# Patient Record
Sex: FEMALE | Race: WHITE | NOT HISPANIC OR LATINO | Employment: OTHER | URBAN - METROPOLITAN AREA
[De-identification: names, ages, dates, MRNs, and addresses within clinical notes are randomized per-mention and may not be internally consistent; named-entity substitution may affect disease eponyms.]

---

## 2017-01-14 RX ORDER — ESCITALOPRAM OXALATE 10 MG/1
TABLET ORAL
Qty: 30 TABLET | Refills: 0 | Status: SHIPPED | OUTPATIENT
Start: 2017-01-14 | End: 2017-02-11 | Stop reason: SDUPTHER

## 2017-02-11 RX ORDER — ESCITALOPRAM OXALATE 10 MG/1
TABLET ORAL
Qty: 30 TABLET | Refills: 0 | Status: SHIPPED | OUTPATIENT
Start: 2017-02-11 | End: 2017-03-17 | Stop reason: SDUPTHER

## 2017-03-17 RX ORDER — ESCITALOPRAM OXALATE 10 MG/1
TABLET ORAL
Qty: 30 TABLET | Refills: 0 | Status: SHIPPED | OUTPATIENT
Start: 2017-03-17 | End: 2017-07-14 | Stop reason: SDUPTHER

## 2017-04-27 RX ORDER — ESCITALOPRAM OXALATE 10 MG/1
10 TABLET ORAL DAILY
Qty: 30 TABLET | Refills: 5 | Status: SHIPPED | OUTPATIENT
Start: 2017-04-27 | End: 2017-07-14 | Stop reason: SDUPTHER

## 2017-07-14 ENCOUNTER — OFFICE VISIT (OUTPATIENT)
Dept: FAMILY MEDICINE CLINIC | Facility: CLINIC | Age: 62
End: 2017-07-14

## 2017-07-14 VITALS
HEART RATE: 69 BPM | HEIGHT: 67 IN | TEMPERATURE: 98.2 F | SYSTOLIC BLOOD PRESSURE: 112 MMHG | WEIGHT: 166.4 LBS | BODY MASS INDEX: 26.12 KG/M2 | DIASTOLIC BLOOD PRESSURE: 80 MMHG | OXYGEN SATURATION: 96 %

## 2017-07-14 DIAGNOSIS — M79.605 PAIN OF LEFT LOWER EXTREMITY: ICD-10-CM

## 2017-07-14 DIAGNOSIS — Z09 FRACTURE FOLLOW-UP: ICD-10-CM

## 2017-07-14 DIAGNOSIS — D64.9 ANEMIA, UNSPECIFIED TYPE: Primary | ICD-10-CM

## 2017-07-14 DIAGNOSIS — E03.8 OTHER SPECIFIED HYPOTHYROIDISM: ICD-10-CM

## 2017-07-14 DIAGNOSIS — R53.83 OTHER FATIGUE: ICD-10-CM

## 2017-07-14 DIAGNOSIS — M89.8X5 PAIN IN FEMUR: ICD-10-CM

## 2017-07-14 LAB
25(OH)D3 SERPL-MCNC: 31.8 NG/ML (ref 30–100)
ALBUMIN SERPL-MCNC: 4.3 G/DL (ref 3.5–5.2)
ALBUMIN/GLOB SERPL: 1.4 G/DL
ALP SERPL-CCNC: 107 U/L (ref 39–117)
ALT SERPL W P-5'-P-CCNC: 11 U/L (ref 1–33)
ANION GAP SERPL CALCULATED.3IONS-SCNC: 13.1 MMOL/L
AST SERPL-CCNC: 21 U/L (ref 1–32)
BILIRUB SERPL-MCNC: 0.3 MG/DL (ref 0.1–1.2)
BUN BLD-MCNC: 9 MG/DL (ref 8–23)
BUN/CREAT SERPL: 13.2 (ref 7–25)
CALCIUM SPEC-SCNC: 9.9 MG/DL (ref 8.6–10.5)
CHLORIDE SERPL-SCNC: 97 MMOL/L (ref 98–107)
CO2 SERPL-SCNC: 27.9 MMOL/L (ref 22–29)
CREAT BLD-MCNC: 0.68 MG/DL (ref 0.57–1)
ERYTHROCYTE [DISTWIDTH] IN BLOOD BY AUTOMATED COUNT: 15.6 % (ref 4.5–15)
GFR SERPL CREATININE-BSD FRML MDRD: 88 ML/MIN/1.73
GLOBULIN UR ELPH-MCNC: 3 GM/DL
GLUCOSE BLD-MCNC: 87 MG/DL (ref 65–99)
HCT VFR BLD AUTO: 36 % (ref 31–42)
HGB BLD-MCNC: 11.5 G/DL (ref 12–18)
LYMPHOCYTES # BLD AUTO: 2.7 10*3/MM3 (ref 1.2–3.4)
LYMPHOCYTES NFR BLD AUTO: 44.8 % (ref 21–51)
MCH RBC QN AUTO: 29.7 PG (ref 26.1–33.1)
MCHC RBC AUTO-ENTMCNC: 31.8 G/DL (ref 33–37)
MCV RBC AUTO: 93.2 FL (ref 80–99)
MONOCYTES # BLD AUTO: 0.2 10*3/MM3 (ref 0.1–0.6)
MONOCYTES NFR BLD AUTO: 3 % (ref 2–9)
NEUTROPHILS # BLD AUTO: 3.1 10*3/MM3 (ref 1.4–6.5)
NEUTROPHILS NFR BLD AUTO: 52.2 % (ref 42–75)
PLATELET # BLD AUTO: 347 10*3/MM3 (ref 150–450)
PMV BLD AUTO: 6.9 FL (ref 7.1–10.5)
POTASSIUM BLD-SCNC: 4.4 MMOL/L (ref 3.5–5.2)
PROT SERPL-MCNC: 7.3 G/DL (ref 6–8.5)
RBC # BLD AUTO: 3.87 10*6/MM3 (ref 4–6)
SODIUM BLD-SCNC: 138 MMOL/L (ref 136–145)
TSH SERPL DL<=0.05 MIU/L-ACNC: 0.78 MIU/ML (ref 0.27–4.2)
WBC NRBC COR # BLD: 6 10*3/MM3 (ref 4.5–10)

## 2017-07-14 PROCEDURE — 99214 OFFICE O/P EST MOD 30 MIN: CPT | Performed by: INTERNAL MEDICINE

## 2017-07-14 PROCEDURE — 80053 COMPREHEN METABOLIC PANEL: CPT | Performed by: INTERNAL MEDICINE

## 2017-07-14 PROCEDURE — 82306 VITAMIN D 25 HYDROXY: CPT | Performed by: INTERNAL MEDICINE

## 2017-07-14 PROCEDURE — 85025 COMPLETE CBC W/AUTO DIFF WBC: CPT | Performed by: INTERNAL MEDICINE

## 2017-07-14 PROCEDURE — 84443 ASSAY THYROID STIM HORMONE: CPT | Performed by: INTERNAL MEDICINE

## 2017-07-14 RX ORDER — ESCITALOPRAM OXALATE 10 MG/1
10 TABLET ORAL DAILY
Qty: 30 TABLET | Refills: 5 | Status: SHIPPED | OUTPATIENT
Start: 2017-07-14 | End: 2017-08-11 | Stop reason: SDUPTHER

## 2017-07-14 RX ORDER — HYDROCODONE BITARTRATE AND ACETAMINOPHEN 10; 325 MG/1; MG/1
1-2 TABLET ORAL
COMMUNITY
Start: 2017-07-11 | End: 2017-10-13

## 2017-07-14 NOTE — PROGRESS NOTES
Subjective   Megan Ratliff is a 61 y.o. female.   Follow-up from hospitalization for fractured femur this occurred in the setting of a fall down steps facilitated by her pet.  History of Present Illness   As above regarding 2 different surgeries for her fractured femur is walking with cane now doing outpatient physical therapy and improved.  She is worried about osteoporosis has several risk factors including family history and her mother surgical menopause followed by chemotherapy for breast cancer.  Does not recall receiving tamoxifen or other estrogen blockers.  Still having some fever pain that improved also been having some mild fatigue lately is not sure with a recent hospitalization versus other does have a history of hypothyroidism the past has not required treatment in recent years so we definitely need recheck that.  Is doing well with her second surgery did require transfusion ×2 during hospital stay she is not aware of getting a repeat CBC so we will do that.  There is no report sleep apnea.  Surprisingly been relative stable with this    Review of Systems   Constitutional: Positive for fatigue.   All other systems reviewed and are negative.      Objective   Vitals:    07/14/17 1249   BP: 112/80   Pulse: 69   Temp: 98.2 °F (36.8 °C)   SpO2: 96%   Weight: 166 lb 6.4 oz (75.5 kg)     Physical Exam   Constitutional: She appears well-developed and well-nourished.   HENT:   Head: Normocephalic and atraumatic.   Eyes: Conjunctivae are normal. Pupils are equal, round, and reactive to light.   Neck: No tracheal deviation present. No thyromegaly present.   Cardiovascular: Normal rate, regular rhythm and normal heart sounds.    Pulmonary/Chest: Effort normal and breath sounds normal.   Abdominal: Soft. Bowel sounds are normal.   Neurological: She is alert.   Ambulates with assistance of a cane in right hand, healing surgical scar present in the left knee and femur area.  Ambulates clearly favoring the right leg  but overall ambulation satisfactory.  The patient physical therapy is happy with her improvement at this point also.   Skin: Skin is warm and dry.       No results found for: INR    Procedures    Assessment/Plan   1.  Fracture follow-up status post hospitalization for medical perspective doing fairly well and vitals signs are stable    2.  Anemia by history we'll get updated CBC patient did require transfusion ×2 in the hospital    3.  Fatigue unspecified plan await lab    4.  Hypothyroidism apparently off medicine for several years time we'll definitely recheck TSH    5.  Left femur pain although improving    6.  Pain left lower extremity apparently some tib-fib injury also healing by history.    Family history for ostial process and the mother as well as surgical menopause due to breast cancer treatment with chemotherapy we'll definitely get a DEXA scan as well as vitamin D looking for osteoporosis    Much of this encounter note is an electronic transcription/translation of spoken language to printed text.  The electronic translation of spoken language may permit erroneous, or at times, nonsensical words or phrases to be inadvertently transcribed.  Although I have reviewed the note for such errors, some may still exist.

## 2017-07-15 DIAGNOSIS — E55.9 VITAMIN D DEFICIENCY: ICD-10-CM

## 2017-07-15 DIAGNOSIS — D64.9 ANEMIA, UNSPECIFIED TYPE: Primary | ICD-10-CM

## 2017-07-15 RX ORDER — ERGOCALCIFEROL 1.25 MG/1
50000 CAPSULE ORAL WEEKLY
Qty: 12 CAPSULE | Refills: 0 | Status: SHIPPED | OUTPATIENT
Start: 2017-07-15 | End: 2017-09-27 | Stop reason: SDUPTHER

## 2017-07-31 ENCOUNTER — TELEPHONE (OUTPATIENT)
Dept: FAMILY MEDICINE CLINIC | Facility: CLINIC | Age: 62
End: 2017-07-31

## 2017-07-31 NOTE — TELEPHONE ENCOUNTER
Perhaps the more information this knees only other spine issues more specifics on what bothers her with her joints.

## 2017-08-08 ENCOUNTER — OFFICE VISIT (OUTPATIENT)
Dept: FAMILY MEDICINE CLINIC | Facility: CLINIC | Age: 62
End: 2017-08-08

## 2017-08-08 VITALS
DIASTOLIC BLOOD PRESSURE: 84 MMHG | BODY MASS INDEX: 25.9 KG/M2 | WEIGHT: 165 LBS | TEMPERATURE: 98.8 F | RESPIRATION RATE: 18 BRPM | HEIGHT: 67 IN | OXYGEN SATURATION: 98 % | HEART RATE: 74 BPM | SYSTOLIC BLOOD PRESSURE: 136 MMHG

## 2017-08-08 DIAGNOSIS — M05.79 RHEUMATOID ARTHRITIS INVOLVING MULTIPLE SITES WITH POSITIVE RHEUMATOID FACTOR (HCC): ICD-10-CM

## 2017-08-08 DIAGNOSIS — G89.29 CHRONIC LOW BACK PAIN WITHOUT SCIATICA, UNSPECIFIED BACK PAIN LATERALITY: Primary | ICD-10-CM

## 2017-08-08 DIAGNOSIS — M54.50 CHRONIC LOW BACK PAIN WITHOUT SCIATICA, UNSPECIFIED BACK PAIN LATERALITY: Primary | ICD-10-CM

## 2017-08-08 DIAGNOSIS — H61.23 BILATERAL IMPACTED CERUMEN: ICD-10-CM

## 2017-08-08 DIAGNOSIS — R22.0 SCALP MASS: ICD-10-CM

## 2017-08-08 PROCEDURE — 69209 REMOVE IMPACTED EAR WAX UNI: CPT | Performed by: INTERNAL MEDICINE

## 2017-08-08 PROCEDURE — 70260 X-RAY EXAM OF SKULL: CPT | Performed by: INTERNAL MEDICINE

## 2017-08-08 PROCEDURE — 72100 X-RAY EXAM L-S SPINE 2/3 VWS: CPT | Performed by: INTERNAL MEDICINE

## 2017-08-08 PROCEDURE — 99214 OFFICE O/P EST MOD 30 MIN: CPT | Performed by: INTERNAL MEDICINE

## 2017-08-08 RX ORDER — FLUCONAZOLE 150 MG/1
150 TABLET ORAL ONCE
Qty: 2 TABLET | Refills: 0 | Status: SHIPPED | OUTPATIENT
Start: 2017-08-08 | End: 2017-08-08

## 2017-08-08 RX ORDER — CEPHALEXIN 500 MG/1
500 CAPSULE ORAL 2 TIMES DAILY
Qty: 20 CAPSULE | Refills: 0 | Status: SHIPPED | OUTPATIENT
Start: 2017-08-08 | End: 2017-10-13

## 2017-08-08 NOTE — PROGRESS NOTES
Subjective   Megan Ratliff is a 61 y.o. female.   Complain of chronic back pain, multiple joint pains including hips shoulders  History of Present Illness   Patient has multiple painful joints as well as spine.  This is in pain management referral.  Has rheumatoid arthritis was hesitant to try any DMARD such as personal history of breast cancer would rather just try to deal with this posteriorly with cancer again.  She is requesting referral to Dr. Jordan or who a family member issues for further pain management help.  Since lower back is monitoring her problems do not seem recent imaging of the lower back which will proceed with.  See if epidurals might benefit her and that area.  Patient's also has a lump in the posterior scalp.  To the left of midline been somewhat recent.  No associated temperature but has had recent dental or oral infection problems and didn't know sometime after that.  Also ears checked that she has recurrent cerumen impaction.  Line pressure in ears wants be checked for cerumen impaction    Review of Systems   HENT: Positive for ear pain.    Musculoskeletal: Positive for arthralgias and back pain.   All other systems reviewed and are negative.      Objective   Vitals:    08/08/17 1414   BP: 136/84   Pulse: 74   Resp: 18   Temp: 98.8 °F (37.1 °C)   SpO2: 98%   Weight: 165 lb (74.8 kg)     Physical Exam   Constitutional: She appears well-developed and well-nourished.   HENT:   Head: Normocephalic and atraumatic.   Mouth/Throat: Oropharynx is clear and moist.   Both ears partially blocked by wax visible TMs appear okay both ears rate irrigated with H2O now resolution of cerumen impaction both TMs appear intact without any irritation ear canal noted.   Eyes: Conjunctivae are normal. Pupils are equal, round, and reactive to light.   Neck: No thyromegaly present.   There is a fullness posterior occiput to the left of midline near the occiput do not find any other soft tissue areas within the  occipital node chain.  No fluctuance is noted measures approximately 1-1.5 cm.  Suspect with recent oral infection is might be reactive supple note.  But is not sure.  Patient is nontoxic, no overt source of infection noted during ENT evaluation.   Cardiovascular: Normal rate, regular rhythm and normal heart sounds.    Pulmonary/Chest: Effort normal.   Abdominal: Soft. Bowel sounds are normal.   Musculoskeletal:   Patient gets 70° forward, 20° backwards, 30° left and right lateral flexion.  Of L-spine minimal discomfort with right lateral flexion   Neurological: She is alert.   Negative straight leg raise on the right, negative on the left.  Right knee jerk is trace unable illicit left knee jerk the patient's had a left knee replacement.   Skin: Skin is warm and dry.       No results found for: INR    Procedures  LS-spine shows some degree of scoliosis to the right.  Narrowing at L4-L5.  No comparison is available.  PA and lateral views obtained.  No other bony or soft tissue abnormalities are noted.  Assessment/Plan   1.  Chronic lumbar pain plan MRI of L-spine, refer to Dr. Blood    2.  Bilateral cerumen impaction ears irrigated with H2O with     resolution of cerumen impaction    3.  Left posterior scalp mass plan Keflex 500 4 times a day ×10 days observe for 2 more weeks to see if area resolves if not we'll get ENT referral.  4.  Rheumatoid arthritis by history declines rheumatology treatment for same.  We'll see if pain management has something offer.    Much of this encounter note is an electronic transcription/translation of spoken language to printed text.  The electronic translation of spoken language may permit erroneous, or at times, nonsensical words or phrases to be inadvertently transcribed.  Although I have reviewed the note for such errors, some may still exist.

## 2017-08-11 DIAGNOSIS — S72.90XK CLOSED FRACTURE OF FEMUR WITH NONUNION, UNSPECIFIED FRACTURE MORPHOLOGY, UNSPECIFIED LATERALITY, UNSPECIFIED PORTION OF FEMUR, SUBSEQUENT ENCOUNTER: Primary | ICD-10-CM

## 2017-08-11 DIAGNOSIS — R93.6 ABNORMAL FINDINGS ON DIAGNOSTIC IMAGING OF LIMBS: ICD-10-CM

## 2017-08-11 RX ORDER — ESCITALOPRAM OXALATE 10 MG/1
10 TABLET ORAL DAILY
Qty: 90 TABLET | Refills: 3 | Status: SHIPPED | OUTPATIENT
Start: 2017-08-11 | End: 2018-05-18 | Stop reason: SDUPTHER

## 2017-08-18 RX ORDER — CALCITONIN SALMON 200 [IU]/.09ML
1 SPRAY, METERED NASAL DAILY
Qty: 1 EACH | Refills: 11 | Status: SHIPPED | OUTPATIENT
Start: 2017-08-18 | End: 2017-08-29

## 2017-08-21 DIAGNOSIS — M54.5 LOW BACK PAIN, UNSPECIFIED BACK PAIN LATERALITY, UNSPECIFIED CHRONICITY, WITH SCIATICA PRESENCE UNSPECIFIED: Primary | ICD-10-CM

## 2017-08-23 ENCOUNTER — TELEPHONE (OUTPATIENT)
Dept: FAMILY MEDICINE CLINIC | Facility: CLINIC | Age: 62
End: 2017-08-23

## 2017-08-23 NOTE — TELEPHONE ENCOUNTER
calcitonin, salmon, (MIACALCIN) 200 UNIT/ACT nasal spray COST ALMOST $100.00 IS THERE SOMETHING ELSE THAT CAN BE CALLED INTO PHARMACY THAT'S CHEAPER

## 2017-08-24 NOTE — TELEPHONE ENCOUNTER
I'll have to do some research this was the generic she was getting?  Also any GI symptoms that would preclude her getting a biphosphonate.

## 2017-08-29 RX ORDER — RISEDRONATE SODIUM 35 MG/1
35 TABLET, FILM COATED ORAL
Qty: 12 TABLET | Refills: 0 | Status: SHIPPED | OUTPATIENT
Start: 2017-08-29 | End: 2017-10-13

## 2017-08-29 NOTE — TELEPHONE ENCOUNTER
PT NEVER USED THE MIACALCIN SHE COULDN'T GET IT  PT STATES SHE DOES HAVE SOME STOMACH ISSUES, . AFTER SHE EATS SOMETIMES SHE HAS STOMACH PAIN. SHE SAID SHE USED TO TAKE NEXIUM AND THAT HELPED HER. SHE SAID SHE DOES NOT HAVE ANY MAJOR GI ISSUES

## 2017-09-22 ENCOUNTER — TELEPHONE (OUTPATIENT)
Dept: FAMILY MEDICINE CLINIC | Facility: CLINIC | Age: 62
End: 2017-09-22

## 2017-09-22 NOTE — TELEPHONE ENCOUNTER
PT WANTS TO BE WORKED IN TODAY SHE IS HAVING NECK AND BACK PAIN AND DOESN'T WANT TO HAVE TO GO TO THE ER

## 2017-09-27 RX ORDER — ERGOCALCIFEROL 1.25 MG/1
CAPSULE ORAL
Qty: 12 CAPSULE | Refills: 0 | Status: SHIPPED | OUTPATIENT
Start: 2017-09-27 | End: 2018-04-02

## 2017-10-13 ENCOUNTER — OFFICE VISIT (OUTPATIENT)
Dept: FAMILY MEDICINE CLINIC | Facility: CLINIC | Age: 62
End: 2017-10-13

## 2017-10-13 VITALS
HEIGHT: 67 IN | HEART RATE: 105 BPM | BODY MASS INDEX: 27.31 KG/M2 | TEMPERATURE: 99.6 F | SYSTOLIC BLOOD PRESSURE: 146 MMHG | WEIGHT: 174 LBS | DIASTOLIC BLOOD PRESSURE: 90 MMHG | OXYGEN SATURATION: 98 %

## 2017-10-13 DIAGNOSIS — M48.061 SPINAL STENOSIS OF LUMBAR REGION WITHOUT NEUROGENIC CLAUDICATION: ICD-10-CM

## 2017-10-13 DIAGNOSIS — Z51.81 ENCOUNTER FOR MEDICATION MONITORING: ICD-10-CM

## 2017-10-13 DIAGNOSIS — J40 BRONCHITIS: Primary | ICD-10-CM

## 2017-10-13 PROCEDURE — 99214 OFFICE O/P EST MOD 30 MIN: CPT | Performed by: INTERNAL MEDICINE

## 2017-10-13 RX ORDER — HYDROCODONE BITARTRATE AND ACETAMINOPHEN 7.5; 325 MG/1; MG/1
1 TABLET ORAL EVERY 6 HOURS PRN
Qty: 30 TABLET | Refills: 0 | Status: SHIPPED | OUTPATIENT
Start: 2017-10-13 | End: 2017-10-20 | Stop reason: SDUPTHER

## 2017-10-13 RX ORDER — CLINDAMYCIN HYDROCHLORIDE 300 MG/1
300 CAPSULE ORAL 3 TIMES DAILY
Qty: 21 CAPSULE | Refills: 0 | Status: SHIPPED | OUTPATIENT
Start: 2017-10-13 | End: 2018-04-02

## 2017-10-13 NOTE — PROGRESS NOTES
Subjective   Megan Ratliff is a 61 y.o. female.   Sore throat into chest   had some old clindamycin also ongoing back discomfort with radiation into the leg.  More so left leg minimally right  History of Present Illness   Spinal stenosis at L4-L5 on basis of recent MRI of lumbar spine done at [place] date of imaging 8/17/17  Increasing pain lower back is wearing condoms for both neurosurgeon and pain management told her to keep both these disease can take a while to get into.  She is doing fair at this point is uncomfortable would like something for back discomfort percent hydrocodone for until recently was on that for a fractured femur under orthopedic surgeon's care.  Respiratory throat is better almost well her chest is moderately better.  She describes pill as a green pill it was clindamycin by her description.  Review of Systems   Constitutional: Negative.    HENT: Positive for sore throat.    Eyes: Negative.    Respiratory: Negative.    Cardiovascular: Negative.    Gastrointestinal: Negative.    Endocrine: Negative.    Genitourinary: Negative.    Musculoskeletal: Positive for back pain.   Skin: Negative.    Allergic/Immunologic: Negative.    Neurological: Negative.    Hematological: Negative.    Psychiatric/Behavioral: Negative.        Objective   Vitals:    10/13/17 1600   BP: 146/90   Pulse: 105   Temp: 99.6 °F (37.6 °C)   SpO2: 98%   Weight: 174 lb (78.9 kg)     Physical Exam   Constitutional: She appears well-developed.   HENT:   Head: Normocephalic and atraumatic.   Right Ear: External ear normal.   Left Ear: External ear normal.   Mouth/Throat: Oropharynx is clear and moist.   Cardiovascular: Normal rate, regular rhythm and normal heart sounds.    Pulmonary/Chest: Effort normal and breath sounds normal.   Abdominal: Soft. Bowel sounds are normal.   Musculoskeletal:   Able to flex 30° forward, 15° backwards, 25° left and right lateral flexion.  Mild discomfort across lower back mild slightly more so left  than on the right.   Neurological: She is alert.   Positive straight-leg raise on the right at 5°, positive straight-leg raise on the left at 60°.  Unable to get get a left knee jerk right knee jerk is 1+   Skin: Skin is warm and dry.   Nursing note and vitals reviewed.      No results found for: INR    Procedures    Assessment/Plan 1.  Spinal stenosis of lumbar spine plan await pending blood pain management and neurosurgery consults  Kamlesh RIVERA, drug contract will get of Lortab 7.5 #30 one every 6 hours as needed give this periodic refills on as-needed basis till seen and cared taken over by neurosurgeon and/or pain management.    2.  Bronchitis clinically improved we'll give some more days clindamycin 300 3 times a day ×10 days' time.    3.  Pharyngitis resolved with present treatment    4.  Medication monitoring as described in #1.    Much of this encounter note is an electronic transcription/translation of spoken language to printed text.  The electronic translation of spoken language may permit erroneous, or at times, nonsensical words or phrases to be inadvertently transcribed.  Although I have reviewed the note for such errors, some may still exist.

## 2017-10-20 ENCOUNTER — TELEPHONE (OUTPATIENT)
Dept: FAMILY MEDICINE CLINIC | Facility: CLINIC | Age: 62
End: 2017-10-20

## 2017-10-20 LAB — CONV REPORT SUMMARY: NORMAL

## 2017-10-20 RX ORDER — HYDROCODONE BITARTRATE AND ACETAMINOPHEN 7.5; 325 MG/1; MG/1
1 TABLET ORAL EVERY 6 HOURS PRN
Qty: 30 TABLET | Refills: 0 | Status: SHIPPED | OUTPATIENT
Start: 2017-10-20 | End: 2017-10-27 | Stop reason: SDUPTHER

## 2017-10-27 ENCOUNTER — TELEPHONE (OUTPATIENT)
Dept: FAMILY MEDICINE CLINIC | Facility: CLINIC | Age: 62
End: 2017-10-27

## 2017-10-27 RX ORDER — HYDROCODONE BITARTRATE AND ACETAMINOPHEN 7.5; 325 MG/1; MG/1
1 TABLET ORAL EVERY 6 HOURS PRN
Qty: 30 TABLET | Refills: 0 | Status: SHIPPED | OUTPATIENT
Start: 2017-10-27 | End: 2017-11-03 | Stop reason: SDUPTHER

## 2017-11-02 ENCOUNTER — TELEPHONE (OUTPATIENT)
Dept: FAMILY MEDICINE CLINIC | Facility: CLINIC | Age: 62
End: 2017-11-02

## 2017-11-03 RX ORDER — HYDROCODONE BITARTRATE AND ACETAMINOPHEN 7.5; 325 MG/1; MG/1
1 TABLET ORAL EVERY 6 HOURS PRN
Qty: 30 TABLET | Refills: 0 | Status: SHIPPED | OUTPATIENT
Start: 2017-11-03 | End: 2017-11-09 | Stop reason: SDUPTHER

## 2017-11-09 ENCOUNTER — TELEPHONE (OUTPATIENT)
Dept: FAMILY MEDICINE CLINIC | Facility: CLINIC | Age: 62
End: 2017-11-09

## 2017-11-09 RX ORDER — HYDROCODONE BITARTRATE AND ACETAMINOPHEN 7.5; 325 MG/1; MG/1
1 TABLET ORAL EVERY 6 HOURS PRN
Qty: 30 TABLET | Refills: 0 | Status: SHIPPED | OUTPATIENT
Start: 2017-11-09 | End: 2018-06-04

## 2017-11-09 NOTE — TELEPHONE ENCOUNTER
WRITTEN FOR HYDROcodone-acetaminophen (NORCO) 7.5-325 MG per tablet    PATIENT IS SEEING PAIN MANAGEMENT NEXT Tuesday

## 2017-12-04 RX ORDER — ERGOCALCIFEROL 1.25 MG/1
CAPSULE ORAL
Qty: 12 CAPSULE | Refills: 0 | OUTPATIENT
Start: 2017-12-04

## 2018-02-13 ENCOUNTER — OFFICE VISIT (OUTPATIENT)
Dept: FAMILY MEDICINE CLINIC | Facility: CLINIC | Age: 63
End: 2018-02-13

## 2018-02-13 VITALS
DIASTOLIC BLOOD PRESSURE: 88 MMHG | OXYGEN SATURATION: 98 % | BODY MASS INDEX: 27.59 KG/M2 | HEART RATE: 71 BPM | SYSTOLIC BLOOD PRESSURE: 146 MMHG | TEMPERATURE: 98.7 F | HEIGHT: 67 IN | WEIGHT: 175.8 LBS

## 2018-02-13 DIAGNOSIS — R10.13 EPIGASTRIC PAIN: Primary | ICD-10-CM

## 2018-02-13 LAB
ALBUMIN SERPL-MCNC: 4.5 G/DL (ref 3.5–5.2)
ALBUMIN/GLOB SERPL: 1.4 G/DL
ALP SERPL-CCNC: 87 U/L (ref 39–117)
ALT SERPL W P-5'-P-CCNC: 11 U/L (ref 1–33)
AMYLASE SERPL-CCNC: 60 U/L (ref 28–100)
ANION GAP SERPL CALCULATED.3IONS-SCNC: 12.5 MMOL/L
AST SERPL-CCNC: 17 U/L (ref 1–32)
BILIRUB SERPL-MCNC: 0.2 MG/DL (ref 0.1–1.2)
BUN BLD-MCNC: 13 MG/DL (ref 8–23)
BUN/CREAT SERPL: 17.3 (ref 7–25)
CALCIUM SPEC-SCNC: 9.7 MG/DL (ref 8.6–10.5)
CHLORIDE SERPL-SCNC: 97 MMOL/L (ref 98–107)
CO2 SERPL-SCNC: 28.5 MMOL/L (ref 22–29)
CREAT BLD-MCNC: 0.75 MG/DL (ref 0.57–1)
ERYTHROCYTE [DISTWIDTH] IN BLOOD BY AUTOMATED COUNT: 14.1 % (ref 4.5–15)
GFR SERPL CREATININE-BSD FRML MDRD: 79 ML/MIN/1.73
GLOBULIN UR ELPH-MCNC: 3.2 GM/DL
GLUCOSE BLD-MCNC: 91 MG/DL (ref 65–99)
HCT VFR BLD AUTO: 40 % (ref 31–42)
HGB BLD-MCNC: 13.2 G/DL (ref 12–18)
LIPASE SERPL-CCNC: 51 U/L (ref 13–60)
LYMPHOCYTES # BLD AUTO: 3.1 10*3/MM3 (ref 1.2–3.4)
LYMPHOCYTES NFR BLD AUTO: 51.2 % (ref 21–51)
MCH RBC QN AUTO: 30.7 PG (ref 26.1–33.1)
MCHC RBC AUTO-ENTMCNC: 32.9 G/DL (ref 33–37)
MCV RBC AUTO: 93.3 FL (ref 80–99)
MONOCYTES # BLD AUTO: 0.4 10*3/MM3 (ref 0.1–0.6)
MONOCYTES NFR BLD AUTO: 7.2 % (ref 2–9)
NEUTROPHILS # BLD AUTO: 2.5 10*3/MM3 (ref 1.4–6.5)
NEUTROPHILS NFR BLD AUTO: 41.6 % (ref 42–75)
PLATELET # BLD AUTO: 274 10*3/MM3 (ref 150–450)
PMV BLD AUTO: 6.1 FL (ref 7.1–10.5)
POTASSIUM BLD-SCNC: 4.8 MMOL/L (ref 3.5–5.2)
PROT SERPL-MCNC: 7.7 G/DL (ref 6–8.5)
RBC # BLD AUTO: 4.29 10*6/MM3 (ref 4–6)
SODIUM BLD-SCNC: 138 MMOL/L (ref 136–145)
WBC NRBC COR # BLD: 6.1 10*3/MM3 (ref 4.5–10)

## 2018-02-13 PROCEDURE — 83690 ASSAY OF LIPASE: CPT | Performed by: INTERNAL MEDICINE

## 2018-02-13 PROCEDURE — 85025 COMPLETE CBC W/AUTO DIFF WBC: CPT | Performed by: INTERNAL MEDICINE

## 2018-02-13 PROCEDURE — 36415 COLL VENOUS BLD VENIPUNCTURE: CPT | Performed by: INTERNAL MEDICINE

## 2018-02-13 PROCEDURE — 99213 OFFICE O/P EST LOW 20 MIN: CPT | Performed by: INTERNAL MEDICINE

## 2018-02-13 PROCEDURE — 82150 ASSAY OF AMYLASE: CPT | Performed by: INTERNAL MEDICINE

## 2018-02-13 PROCEDURE — 80053 COMPREHEN METABOLIC PANEL: CPT | Performed by: INTERNAL MEDICINE

## 2018-02-13 RX ORDER — OMEPRAZOLE 40 MG/1
40 CAPSULE, DELAYED RELEASE ORAL DAILY
Qty: 30 CAPSULE | Refills: 2 | Status: ON HOLD | OUTPATIENT
Start: 2018-02-13 | End: 2018-03-20

## 2018-02-13 RX ORDER — ASCORBIC ACID 500 MG
500 TABLET ORAL DAILY
COMMUNITY
End: 2018-04-02

## 2018-02-13 RX ORDER — MELATONIN
1000 DAILY
COMMUNITY
End: 2018-04-02

## 2018-02-13 NOTE — PROGRESS NOTES
Subjective   Megan Ratliff is a 62 y.o. female.   At the epigastric discomfort for several months much worse last week or 2 grandchild landed on her stomach all she is seated playing and she had very pronounced pain with that.  Had dark stools ×1 only partially history of gastric ulcer in the past ×1  History of Present Illness   Stomach pain  Hx of gatriculcer hx of pancreatitis ×1 after testing perhaps an ERCP test  Drinking water helps help  no pain referred to back.  No specific foods that seem to worsen it no true postprandial pain reported.  Chest discomfort off and on no weight loss reported.  Some recent loose stools  Review of Systems   All other systems reviewed and are negative.      Objective   Vitals:    02/13/18 1610   BP: 146/88   Pulse: 71   Temp: 98.7 °F (37.1 °C)   SpO2: 98%   Weight: 79.7 kg (175 lb 12.8 oz)     Physical Exam   Constitutional: She appears well-developed and well-nourished.   HENT:   Head: Normocephalic and atraumatic.   Cardiovascular: Normal rate, regular rhythm and normal heart sounds.    Pulmonary/Chest: Effort normal and breath sounds normal.   Abdominal: Soft. Bowel sounds are normal.   Mild pain with palpation epigastrium no guarding   Nursing note and vitals reviewed.      No results found for: INR    Procedures    Assessment/Plan   .  1.  Epigastric pain plan omeprazole 40 mg daily await lab also referral to GI Dr. Ureña    2.  Personal history gastric ulcer plan see #1    Much of this encounter note is an electronic transcription/translation of spoken language to printed text.  The electronic translation of spoken language may permit erroneous, or at times, nonsensical words or phrases to be inadvertently transcribed.  Although I have reviewed the note for such errors, some may still exist.

## 2018-02-26 ENCOUNTER — PREP FOR SURGERY (OUTPATIENT)
Dept: OTHER | Facility: HOSPITAL | Age: 63
End: 2018-02-26

## 2018-02-26 DIAGNOSIS — R10.13 EPIGASTRIC ABDOMINAL PAIN: ICD-10-CM

## 2018-02-26 DIAGNOSIS — Z12.11 ENCOUNTER FOR SCREENING FOR MALIGNANT NEOPLASM OF COLON: Primary | ICD-10-CM

## 2018-02-26 RX ORDER — SODIUM CHLORIDE, SODIUM LACTATE, POTASSIUM CHLORIDE, CALCIUM CHLORIDE 600; 310; 30; 20 MG/100ML; MG/100ML; MG/100ML; MG/100ML
30 INJECTION, SOLUTION INTRAVENOUS CONTINUOUS
Status: CANCELLED | OUTPATIENT
Start: 2018-03-20

## 2018-02-27 PROBLEM — Z12.11 ENCOUNTER FOR SCREENING FOR MALIGNANT NEOPLASM OF COLON: Status: ACTIVE | Noted: 2018-02-27

## 2018-02-27 PROBLEM — R10.13 EPIGASTRIC ABDOMINAL PAIN: Status: ACTIVE | Noted: 2018-02-27

## 2018-03-20 ENCOUNTER — ANESTHESIA (OUTPATIENT)
Dept: GASTROENTEROLOGY | Facility: HOSPITAL | Age: 63
End: 2018-03-20

## 2018-03-20 ENCOUNTER — ANESTHESIA EVENT (OUTPATIENT)
Dept: GASTROENTEROLOGY | Facility: HOSPITAL | Age: 63
End: 2018-03-20

## 2018-03-20 ENCOUNTER — HOSPITAL ENCOUNTER (OUTPATIENT)
Facility: HOSPITAL | Age: 63
Setting detail: HOSPITAL OUTPATIENT SURGERY
Discharge: HOME | End: 2018-03-20
Attending: INTERNAL MEDICINE | Admitting: INTERNAL MEDICINE

## 2018-03-20 VITALS
SYSTOLIC BLOOD PRESSURE: 135 MMHG | DIASTOLIC BLOOD PRESSURE: 69 MMHG | BODY MASS INDEX: 27.42 KG/M2 | RESPIRATION RATE: 16 BRPM | WEIGHT: 174.7 LBS | TEMPERATURE: 97.9 F | HEART RATE: 61 BPM | OXYGEN SATURATION: 96 % | HEIGHT: 67 IN

## 2018-03-20 DIAGNOSIS — R10.13 EPIGASTRIC ABDOMINAL PAIN: ICD-10-CM

## 2018-03-20 DIAGNOSIS — Z12.11 ENCOUNTER FOR SCREENING FOR MALIGNANT NEOPLASM OF COLON: ICD-10-CM

## 2018-03-20 PROCEDURE — 43239 EGD BIOPSY SINGLE/MULTIPLE: CPT | Performed by: INTERNAL MEDICINE

## 2018-03-20 PROCEDURE — 88312 SPECIAL STAINS GROUP 1: CPT | Performed by: INTERNAL MEDICINE

## 2018-03-20 PROCEDURE — 25010000002 PROPOFOL 10 MG/ML EMULSION: Performed by: ANESTHESIOLOGY

## 2018-03-20 PROCEDURE — 88305 TISSUE EXAM BY PATHOLOGIST: CPT | Performed by: INTERNAL MEDICINE

## 2018-03-20 PROCEDURE — G0121 COLON CA SCRN NOT HI RSK IND: HCPCS | Performed by: INTERNAL MEDICINE

## 2018-03-20 RX ORDER — SODIUM CHLORIDE 0.9 % (FLUSH) 0.9 %
1-10 SYRINGE (ML) INJECTION AS NEEDED
Status: DISCONTINUED | OUTPATIENT
Start: 2018-03-20 | End: 2018-03-20 | Stop reason: HOSPADM

## 2018-03-20 RX ORDER — PROPOFOL 10 MG/ML
VIAL (ML) INTRAVENOUS CONTINUOUS PRN
Status: DISCONTINUED | OUTPATIENT
Start: 2018-03-20 | End: 2018-03-20 | Stop reason: SURG

## 2018-03-20 RX ORDER — PROPOFOL 10 MG/ML
VIAL (ML) INTRAVENOUS AS NEEDED
Status: DISCONTINUED | OUTPATIENT
Start: 2018-03-20 | End: 2018-03-20 | Stop reason: SURG

## 2018-03-20 RX ORDER — LIDOCAINE HYDROCHLORIDE 20 MG/ML
INJECTION, SOLUTION INFILTRATION; PERINEURAL AS NEEDED
Status: DISCONTINUED | OUTPATIENT
Start: 2018-03-20 | End: 2018-03-20 | Stop reason: SURG

## 2018-03-20 RX ORDER — OMEPRAZOLE 40 MG/1
40 CAPSULE, DELAYED RELEASE ORAL
Qty: 30 CAPSULE | Refills: 2 | Status: SHIPPED | OUTPATIENT
Start: 2018-03-20 | End: 2018-07-14 | Stop reason: SDUPTHER

## 2018-03-20 RX ORDER — SODIUM CHLORIDE, SODIUM LACTATE, POTASSIUM CHLORIDE, CALCIUM CHLORIDE 600; 310; 30; 20 MG/100ML; MG/100ML; MG/100ML; MG/100ML
30 INJECTION, SOLUTION INTRAVENOUS CONTINUOUS
Status: DISCONTINUED | OUTPATIENT
Start: 2018-03-20 | End: 2018-03-20 | Stop reason: HOSPADM

## 2018-03-20 RX ADMIN — PROPOFOL 50 MG: 10 INJECTION, EMULSION INTRAVENOUS at 10:51

## 2018-03-20 RX ADMIN — SODIUM CHLORIDE, POTASSIUM CHLORIDE, SODIUM LACTATE AND CALCIUM CHLORIDE: 600; 310; 30; 20 INJECTION, SOLUTION INTRAVENOUS at 10:45

## 2018-03-20 RX ADMIN — LIDOCAINE HYDROCHLORIDE 40 MG: 20 INJECTION, SOLUTION INFILTRATION; PERINEURAL at 10:51

## 2018-03-20 RX ADMIN — PROPOFOL 140 MCG/KG/MIN: 10 INJECTION, EMULSION INTRAVENOUS at 10:51

## 2018-03-20 RX ADMIN — SODIUM CHLORIDE, POTASSIUM CHLORIDE, SODIUM LACTATE AND CALCIUM CHLORIDE 30 ML/HR: 600; 310; 30; 20 INJECTION, SOLUTION INTRAVENOUS at 10:41

## 2018-03-20 NOTE — ANESTHESIA POSTPROCEDURE EVALUATION
"Patient: Megan Ratliff    Procedure Summary     Date:  03/20/18 Room / Location:  [facility] ENDOSCOPY 1 / [facility] ENDOSCOPY    Anesthesia Start:  1045 Anesthesia Stop:  1128    Procedures:       ESOPHAGOGASTRODUODENOSCOPY WITH COLD BIOPSIES (N/A Esophagus)      COLONOSCOPY TO CECUM AND INTO TERMINAL ILEUM (N/A ) Diagnosis:       Epigastric abdominal pain      Encounter for screening for malignant neoplasm of colon      (Epigastric abdominal pain [R10.13])      (Encounter for screening for malignant neoplasm of colon [Z12.11])    Surgeon:  Claudy Ureña MD Provider:  Sanchez Cortez MD    Anesthesia Type:  Not recorded ASA Status:  2          Anesthesia Type: No value filed.  Last vitals  BP   102/68 (03/20/18 1032)   Temp   36.6 °C (97.9 °F) (03/20/18 1032)   Pulse   60 (03/20/18 1032)   Resp   18 (03/20/18 1032)     SpO2   98 % (03/20/18 1032)     Post Anesthesia Care and Evaluation    Patient location during evaluation: bedside  Patient participation: complete - patient participated  Level of consciousness: awake  Pain score: 2  Pain management: adequate  Airway patency: patent  Anesthetic complications: No anesthetic complications  PONV Status: none  Cardiovascular status: acceptable  Respiratory status: acceptable  Hydration status: acceptable    Comments: /68 (BP Location: Left arm, Patient Position: Lying) Comment (BP Location): no b/p or stick right arm  Pulse 60   Temp 36.6 °C (97.9 °F) (Oral)   Resp 18   Ht 170.2 cm (67\")   Wt 79.2 kg (174 lb 11.2 oz)   SpO2 98%   BMI 27.36 kg/m²         "

## 2018-03-20 NOTE — H&P
CC: Here for endoscopic evaluation.     HPI: Epigastric pain. Screening.     Past Medical History:   Diagnosis Date   • Arthritis     rheumatiod   • Broken leg    • Cancer     breast   • Fibromyalgia, primary    • History of transfusion     sgy       Past Surgical History:   Procedure Laterality Date   • APPENDECTOMY     • BREAST SURGERY      right lumpectomy   • CHOLECYSTECTOMY     • FEMUR SURGERY     • HYSTERECTOMY     • LEG SURGERY Left    • REPLACEMENT TOTAL KNEE BILATERAL     • SHOULDER SURGERY     • TONSILLECTOMY         Prior to Admission medications    Medication Sig Start Date End Date Taking? Authorizing Provider   escitalopram (LEXAPRO) 10 MG tablet Take 1 tablet by mouth Daily. 8/11/17  Yes Carlos Young Jr., MD   HYDROcodone-acetaminophen (NORCO) 7.5-325 MG per tablet Take 1 tablet by mouth Every 6 (Six) Hours As Needed for Moderate Pain . 11/9/17  Yes Carlos Young Jr., MD   omeprazole (priLOSEC) 40 MG capsule Take 1 capsule by mouth Daily. 2/13/18  Yes Carlos Young Jr., MD   cholecalciferol (VITAMIN D3) 1000 units tablet Take 1,000 Units by mouth Daily.    Historical Provider, MD   clindamycin (CLEOCIN) 300 MG capsule Take 1 capsule by mouth 3 (Three) Times a Day. x10d 10/13/17   Carlos Young Jr., MD   vitamin C (ASCORBIC ACID) 500 MG tablet Take 500 mg by mouth Daily.    Historical Provider, MD   vitamin D (ERGOCALCIFEROL) 02142 units capsule capsule TAKE 1 CAPSULE BY MOUTH 1 (ONE) TIME PER WEEK. 9/27/17   Carlos Young Jr., MD       Allergies   Allergen Reactions   • Codeine Itching     Swelling and vomiting   • Percocet [Oxycodone-Acetaminophen] Nausea And Vomiting   • Zolpidem Other (See Comments)     Adverse reaction prevention of sleep       Family History   Problem Relation Age of Onset   • Colon cancer Mother        OBJECTIVE:    Patient's vital signs reviewed. No acute distress.     Chest is clear, no wheezing or rales. Normal symmetric air entry throughout both lung fields.  No chest wall deformities or tenderness.    S1 and S2 normal, no murmurs, clicks, gallops or rubs. Regular rate and rhythm. Chest is clear; no wheezes or rales. No edema or JVD.    The abdomen is soft without tenderness, guarding, mass, rebound or organomegaly. Bowel sounds are normal. No CVA tenderness or inguinal adenopathy noted.    Patient is alert, oriented and with an intact memory.    Assessment: Epigastric pain. Screening.     Plan: EGD. Colonoscopy.

## 2018-03-20 NOTE — ANESTHESIA PREPROCEDURE EVALUATION
Anesthesia Evaluation     Patient summary reviewed and Nursing notes reviewed   NPO Solid Status: > 8 hours  NPO Liquid Status: > 2 hours           Airway   Mallampati: II  TM distance: >3 FB  Neck ROM: full  no difficulty expected  Dental - normal exam     Pulmonary - negative pulmonary ROS and normal exam    breath sounds clear to auscultation  (-) shortness of breath, sleep apnea, decreased breath sounds, wheezes  Cardiovascular - negative cardio ROS and normal exam  Exercise tolerance: good (4-7 METS)    Rhythm: regular  Rate: normal    (-) CAD, angina, CHF, orthopnea, PND, GUTIERRES, PVD      Neuro/Psych- negative ROS  (-) seizures, neuromuscular disease, TIA, CVA, headaches, syncope, weakness, numbness  GI/Hepatic/Renal/Endo - negative ROS   (-) liver disease, diabetes    Musculoskeletal     (+) myalgias,   Abdominal  - normal exam   Substance History - negative use  (-) alcohol use, drug use     OB/GYN negative ob/gyn ROS         Other      history of cancer                  Anesthesia Plan    ASA 2

## 2018-03-21 LAB
CYTO UR: NORMAL
LAB AP CASE REPORT: NORMAL
Lab: NORMAL
PATH REPORT.FINAL DX SPEC: NORMAL
PATH REPORT.GROSS SPEC: NORMAL

## 2018-04-02 ENCOUNTER — OFFICE VISIT (OUTPATIENT)
Dept: FAMILY MEDICINE CLINIC | Facility: CLINIC | Age: 63
End: 2018-04-02

## 2018-04-02 VITALS
TEMPERATURE: 98.8 F | DIASTOLIC BLOOD PRESSURE: 80 MMHG | HEART RATE: 74 BPM | OXYGEN SATURATION: 98 % | HEIGHT: 67 IN | SYSTOLIC BLOOD PRESSURE: 110 MMHG | WEIGHT: 176.4 LBS | BODY MASS INDEX: 27.69 KG/M2

## 2018-04-02 DIAGNOSIS — G47.00 INSOMNIA, UNSPECIFIED TYPE: Primary | ICD-10-CM

## 2018-04-02 DIAGNOSIS — K20.90 ESOPHAGITIS: ICD-10-CM

## 2018-04-02 PROCEDURE — 99213 OFFICE O/P EST LOW 20 MIN: CPT | Performed by: INTERNAL MEDICINE

## 2018-04-02 NOTE — PROGRESS NOTES
Subjective   Megan Ratliff is a 62 y.o. female.   Recent insomnia also follow-up after recent stomach ulcer evaluation  History of Present Illness   Patient had both EGD and C scope done with findings on C scope few external hemorrhoids and EGD of a esophagitis.  No true gastric work drawn also reported.  She been placed on omeprazole 40 mg twice a day.  Is doing somewhat better.  Sleep is problematic no new stressors in patient's life patient has both trouble going sleep and staying asleep historically has not been case has tried some over-the-counter medicine which I am anticipating is a Benadryl-type medicine which seems to have the opposite effect of making her hyper.    Review of Systems   Psychiatric/Behavioral: Positive for sleep disturbance.   All other systems reviewed and are negative.      Objective   Vitals:    04/02/18 1410   BP: 110/80   Pulse: 74   Temp: 98.8 °F (37.1 °C)   SpO2: 98%   Weight: 80 kg (176 lb 6.4 oz)     Physical Exam   Constitutional: She appears well-developed and well-nourished.   HENT:   Head: Normocephalic and atraumatic.   Cardiovascular: Normal rate, regular rhythm and normal heart sounds.    Pulmonary/Chest: Effort normal and breath sounds normal.   Abdominal: Soft. Bowel sounds are normal.   Nursing note and vitals reviewed.      No results found for: INR    Procedures    Assessment/Plan   .  1.  Insomnia initially began with trial of melatonin in a higher quality brand if that's not successful patient is let me know and we'll let her try trazodone 50    2.  Esophagitis plan continue omeprazole if patient is not safely.  2 month ricki she is to contact Dr. Ureña for further guidance.  Further follow-up contingent on pending conditions    Much of this encounter note is an electronic transcription/translation of spoken language to printed text.  The electronic translation of spoken language may permit erroneous, or at times, nonsensical words or phrases to be inadvertently  transcribed.  Although I have reviewed the note for such errors, some may still exist. If there are questions or for further clarification, please contact me.

## 2018-04-23 ENCOUNTER — OFFICE VISIT (OUTPATIENT)
Dept: FAMILY MEDICINE CLINIC | Facility: CLINIC | Age: 63
End: 2018-04-23

## 2018-04-23 VITALS
HEIGHT: 67 IN | OXYGEN SATURATION: 97 % | TEMPERATURE: 98.2 F | HEART RATE: 77 BPM | SYSTOLIC BLOOD PRESSURE: 130 MMHG | BODY MASS INDEX: 27.84 KG/M2 | DIASTOLIC BLOOD PRESSURE: 90 MMHG | WEIGHT: 177.4 LBS

## 2018-04-23 DIAGNOSIS — Z00.00 INITIAL MEDICARE ANNUAL WELLNESS VISIT: Primary | ICD-10-CM

## 2018-04-23 DIAGNOSIS — M25.511 ACUTE PAIN OF RIGHT SHOULDER: ICD-10-CM

## 2018-04-23 DIAGNOSIS — M89.8X2 PAIN OF RIGHT HUMERUS: ICD-10-CM

## 2018-04-23 DIAGNOSIS — F41.9 ANXIETY: ICD-10-CM

## 2018-04-23 DIAGNOSIS — R10.31 RIGHT LOWER QUADRANT ABDOMINAL PAIN: ICD-10-CM

## 2018-04-23 PROCEDURE — 73060 X-RAY EXAM OF HUMERUS: CPT | Performed by: INTERNAL MEDICINE

## 2018-04-23 PROCEDURE — G0438 PPPS, INITIAL VISIT: HCPCS | Performed by: INTERNAL MEDICINE

## 2018-04-23 PROCEDURE — 99214 OFFICE O/P EST MOD 30 MIN: CPT | Performed by: INTERNAL MEDICINE

## 2018-04-23 PROCEDURE — 73030 X-RAY EXAM OF SHOULDER: CPT | Performed by: INTERNAL MEDICINE

## 2018-04-23 NOTE — PROGRESS NOTES
Subjective   Megan Ratliff is a 62 y.o. female.   Medicare wellness visit also has ongoing right shoulder discomfort as well as right lower quadrant discomfort  History of Present Illness   rlq   discomfort for several weeks time.  No particular injury slightly worse with movement.  Has had prior appendectomy no psoas temperature change habits with this.  Shoulder bothers her with certain movements right shoulder down to mid humerus area anteriorly.  Does not predict which movements are going.  This.  History of significant fall with mild discomfort shoulder since that time.    Review of Systems   Gastrointestinal: Positive for abdominal pain.   All other systems reviewed and are negative.      Objective   Vitals:    04/23/18 1400   BP: 130/90   Pulse: 77   Temp: 98.2 °F (36.8 °C)   SpO2: 97%   Weight: 80.5 kg (177 lb 6.4 oz)     Physical Exam   Constitutional: She appears well-developed and well-nourished.   HENT:   Head: Normocephalic and atraumatic.   Eyes: Conjunctivae are normal. Pupils are equal, round, and reactive to light.   Cardiovascular: Normal rate, regular rhythm and normal heart sounds.    Pulmonary/Chest: Effort normal and breath sounds normal.   Abdominal: Soft. Bowel sounds are normal.   Mild discomfort right lower quadrant slightly soft abdomen we bears down discomfort diminishes.  There is a very subtle weakness with patient right lower quadrant with palpation going from supine to standing.  Strongly suspect hernia there but not 100% sure.  Certainly no guarding or rebound   Musculoskeletal:   Minimal pain with use right biceps right mid and proximal bicep area.  No focal tenderness.  Good strength with use of biceps and triceps.  Regarding shoulder no significant pain with passive range of motion right shoulder does have pain with palpation anterior shoulder.  Fair range of motion right shoulder is able to abduct to perhaps 1° without discomfort can get up to 150° unable to go above 150°.  Good  external rotation without significant discomfort.   is close to the same for right-hand dominant individual perhaps marginally stronger left hand than the right hand .   Neurological: She is alert.   Unremarkable gait and station   Skin: Skin is warm and dry.   Psychiatric: She has a normal mood and affect. Her behavior is normal.   No voiced thoughts off hurting self or hurting others.  Just wants talk with counselor   Nursing note and vitals reviewed.      No results found for: INR    Procedures  X-ray right shoulder 2 views obtained.  No comparison available.  No bony or soft tissue abnormalities are noted.    X-ray right humerus 2 views obtained.  No comparison available.  No bony soft tissue abnormalities noted.  Assessment/Plan   1.  Medicare wellness exam initial visit    2.  Right shoulder pain plan refer to take surgeon choice.    3.  Right upper arm pain again that orthopedic surgeon address this.    4.  Right lower quadrant pain suspected hernia plan refer to Dr. Dong for further evaluation    5.  Anxiety disorder possible PTSD listed psychologist psychiatrist given the patient.  Increase Lexapro to 50 mg daily call regarding status one month and as needed.  Patient also requests referral to counselor for family issues related 1 daughter was 18 years old.  Is finding some troubling dreams would like cancer to further explore this with her.  We will also increase patient's Lexapro to 15 mg daily from 10 mg today  Much of this encounter note is an electronic transcription/translation of spoken language to printed text.  The electronic translation of spoken language may permit erroneous, or at times, nonsensical words or phrases to be inadvertently transcribed.  Although I have reviewed the note for such errors, some may still exist. If there are questions or for further clarification, please contact me.

## 2018-04-23 NOTE — PROGRESS NOTES
QUICK REFERENCE INFORMATION:  The ABCs of the Annual Wellness Visit    Initial Medicare Wellness Visit    HEALTH RISK ASSESSMENT    1955    Recent Hospitalizations:  Recently treated at the following:  Other: [facility]        Current Medical Providers:  Patient Care Team:  Carlos Young Jr., MD as PCP - General  Carlos Young Jr., MD as PCP - Family Medicine  Tanner Stiles MD as PCP - Claims Attributed  Mo Blood MD as Consulting Physician (Pain Medicine)        Smoking Status:  History   Smoking Status   • Former Smoker   • Types: Cigarettes   Smokeless Tobacco   • Never Used       Alcohol Consumption:  History   Alcohol Use No       Depression Screen:   PHQ-2/PHQ-9 Depression Screening 4/23/2018   Little interest or pleasure in doing things 1   Feeling down, depressed, or hopeless 1   Trouble falling or staying asleep, or sleeping too much 1   Feeling tired or having little energy 1   Poor appetite or overeating 0   Feeling bad about yourself - or that you are a failure or have let yourself or your family down 3   Trouble concentrating on things, such as reading the newspaper or watching television 1   Moving or speaking so slowly that other people could have noticed. Or the opposite - being so fidgety or restless that you have been moving around a lot more than usual 0   Thoughts that you would be better off dead, or of hurting yourself in some way 0   Total Score 8   If you checked off any problems, how difficult have these problems made it for you to do your work, take care of things at home, or get along with other people? Not difficult at all       Health Habits and Functional and Cognitive Screening:  Functional & Cognitive Status 4/23/2018   Do you have difficulty preparing food and eating? No   Do you have difficulty bathing yourself, getting dressed or grooming yourself? No   Do you have difficulty using the toilet? No   Do you have difficulty moving around from place to place? No   Do  you have trouble with steps or getting out of a bed or a chair? No   In the past year have you fallen or experienced a near fall? No   Current Diet Well Balanced Diet   Dental Exam Up to date   Eye Exam Up to date   Exercise (times per week) 3 times per week   Current Exercise Activities Include Walking   Do you need help using the phone?  No   Are you deaf or do you have serious difficulty hearing?  No   Do you need help with transportation? No   Do you need help shopping? No   Do you need help preparing meals?  No   Do you need help with housework?  No   Do you need help with laundry? No   Do you need help taking your medications? No   Do you need help managing money? No   Do you ever drive or ride in a car without wearing a seat belt? No   Have you felt unusual stress, anger or loneliness in the last month? No   Who do you live with? Other   If you need help, do you have trouble finding someone available to you? No   Have you been bothered in the last four weeks by sexual problems? No   Do you have difficulty concentrating, remembering or making decisions? No           Does the patient have evidence of cognitive impairment? No    Asiprin use counseling: Start ASA 81 mg daily       Recent Lab Results:    Visual Acuity:  No exam data present    Age-appropriate Screening Schedule:  Refer to the list below for future screening recommendations based on patient's age, sex and/or medical conditions. Orders for these recommended tests are listed in the plan section. The patient has been provided with a written plan.    Health Maintenance   Topic Date Due   • TDAP/TD VACCINES (1 - Tdap) 10/23/1975   • PAP SMEAR  07/29/2016   • ZOSTER VACCINE  10/23/2016   • INFLUENZA VACCINE  08/01/2018   • MAMMOGRAM  09/28/2018   • COLONOSCOPY  03/20/2028        Subjective   History of Present Illness    Megan Ratliff is a 62 y.o. female who presents for an Annual Wellness Visit.    The following portions of the patient's history were  "reviewed and updated as appropriate: allergies, current medications, past family history, past medical history, past social history, past surgical history and problem list.    Outpatient Medications Prior to Visit   Medication Sig Dispense Refill   • escitalopram (LEXAPRO) 10 MG tablet Take 1 tablet by mouth Daily. 90 tablet 3   • HYDROcodone-acetaminophen (NORCO) 7.5-325 MG per tablet Take 1 tablet by mouth Every 6 (Six) Hours As Needed for Moderate Pain . 30 tablet 0   • omeprazole (priLOSEC) 40 MG capsule Take 1 capsule by mouth 2 (Two) Times a Day Before Meals. 30 capsule 2     No facility-administered medications prior to visit.        Patient Active Problem List   Diagnosis   • Epigastric abdominal pain   • Encounter for screening for malignant neoplasm of colon       Advance Care Planning:  has NO advance directive - not interested in additional information    Identification of Risk Factors:  Risk factors include: na.    Review of Systems   All other systems reviewed and are negative.      Compared to one year ago, the patient feels her physical health is better.  Compared to one year ago, the patient feels her mental health is the same.    Objective     Physical Exam    Vitals:    04/23/18 1400   BP: 130/90   BP Location: Left arm   Patient Position: Sitting   Cuff Size: Adult   Pulse: 77   Temp: 98.2 °F (36.8 °C)   TempSrc: Oral   SpO2: 97%   Weight: 80.5 kg (177 lb 6.4 oz)   Height: 170.2 cm (67\")   PainSc:   5       Patient's Body mass index is 27.78 kg/m². BMI is within normal parameters. No follow-up required.      Assessment/Plan   Patient Self-Management and Personalized Health Advice  The patient has been provided with information about: prevention of cardiac or vascular disease and preventive services including:   · Fall Risk assessment done.    Visit Diagnoses:    ICD-10-CM ICD-9-CM   1. Encounter for screening for malignant neoplasm of colon Z12.11 V76.51   2. Initial Medicare annual wellness " visit Z00.00 V70.0       No orders of the defined types were placed in this encounter.      Outpatient Encounter Prescriptions as of 4/23/2018   Medication Sig Dispense Refill   • escitalopram (LEXAPRO) 10 MG tablet Take 1 tablet by mouth Daily. 90 tablet 3   • HYDROcodone-acetaminophen (NORCO) 7.5-325 MG per tablet Take 1 tablet by mouth Every 6 (Six) Hours As Needed for Moderate Pain . 30 tablet 0   • omeprazole (priLOSEC) 40 MG capsule Take 1 capsule by mouth 2 (Two) Times a Day Before Meals. 30 capsule 2     No facility-administered encounter medications on file as of 4/23/2018.        Reviewed use of high risk medication in the elderly: yes  Reviewed for potential of harmful drug interactions in the elderly: yes    Follow Up:  No Follow-up on file.     An After Visit Summary and PPPS with all of these plans were given to the patient.

## 2018-04-26 ENCOUNTER — TELEPHONE (OUTPATIENT)
Dept: FAMILY MEDICINE CLINIC | Facility: CLINIC | Age: 63
End: 2018-04-26

## 2018-04-26 DIAGNOSIS — Z85.3 HISTORY OF BREAST CANCER: Primary | ICD-10-CM

## 2018-04-26 NOTE — TELEPHONE ENCOUNTER
PATIENT WAS SEEN A COUPLE DAYS AGO AND WANTED YOU TO PUT IN A REFERRAL FOR DIAGNOSTIC MAMMO TO  [facility]      Vascular and Interventional Radiology Pre-Procedure Note     Patient: Darion Buckner Date: 2018   : 1935 Attending: Armani Baumann MD   82 year old male      Chief Complaint:  Chief Complaint   Patient presents with   • Abnormal Lab Results     Chief Complaint   Patient presents with   • Abnormal Lab Results       Pre-Op Diagnosis:  ESRD    Patient Active Problem List    Diagnosis Date Noted   • ESRD (end stage renal disease) (CMS/HCC) 2018     Priority: Low   • Chronic systolic heart failure (CMS/HCC) 2018     Priority: Low   • Chronic stasis dermatitis 2018     Priority: Low   • Upgrade to Medtronic CRT-D 2017     Priority: Low     HAS REMOTE FOLLOW UP     • Leg edema, left 2017     Priority: Low   • Lichenification 2017     Priority: Low   • Chronic constipation 2017     Priority: Low   • Paroxysmal atrial fibrillation (CMS/HCC) 2016     Priority: Low   • Controlled type 2 diabetes mellitus with diabetic nephropathy, with long-term current use of insulin (CMS/HCC) 2016     Priority: Low   • Coronary artery disease involving native coronary artery of native heart without angina pectoris 2016     Priority: Low   • Chronic kidney disease, stage IV (severe) (CMS/HCC) 2016     Priority: Low   • Leg cramps, sleep related 2015     Priority: Low   • TIA (transient ischemic attack) 10/30/2014     Priority: Low   • Acute on chronic combined systolic and diastolic congestive heart failure (CMS/HCC) 10/30/2014     Priority: Low   • Essential hypertension, benign 2014     Priority: Low   • Dyslipidemia (high LDL; low HDL) 2013     Priority: Low     Past Medical History:   Diagnosis Date   • Atrial fibrillation (CMS/HCC)     paroxysmal   • CAD (coronary artery disease)     LAD, OM3 and OM4   • Cerebral infarction (CMS/HCC)    • Chronic kidney disease    • Congestive cardiac failure (CMS/HCC)    • Diabetes mellitus, type 2  (CMS/[facility]) 1992    states blood sugars have been running aroun 110-162 fasting   • Glaucoma    • Gout    • HTN (hypertension)    • Hyperlipidemia    • Proteinuria    • PVD (peripheral vascular disease) (CMS/[facility])    • Secondary hyperparathyroidism (CMS/[facility])    • Uncomplicated senile dementia       Past Surgical History:   Procedure Laterality Date   • Appendectomy     • Av fistula placement, radiocephalic Left 01/18/2017    Syed Gonzáles MD   • Colonoscopy diagnostic  01/01/2005, 4/8/13    repeat in 5 years (2018)   • Coronary angioplasty with stent placement  2007    PIETER LAD, prox. OM4 and OM3   • Esophagogastroduodenoscopy transoral flex diag  4/8/13    repeat in 1 year, dx mychal (2014)   • Nasal septum surgery  07/10/2008   • Pacemaker  2005    Medtronic DDDR pacemaker      Social History   Substance Use Topics   • Smoking status: Former Smoker     Packs/day: 1.00     Years: 40.00     Types: Cigarettes     Quit date: 1/1/2004   • Smokeless tobacco: Never Used      Comment: not done - pt has quit smoking long ago   • Alcohol use 4.2 oz/week     7 Standard drinks or equivalent per week      Family History   Problem Relation Age of Onset   • Heart disease Sister    • Cancer Brother      throat   • Heart disease Sister    • Cancer Mother       Prescriptions Prior to Admission   Medication Sig Dispense Refill   • metOLazone (ZAROXOLYN) 5 MG tablet 1 a week 4 tablet 1   • furosemide (LASIX) 40 MG tablet TAKE 2 TABLETS BY MOUTH TWO TIMES A  tablet 0   • carvedilol (COREG) 12.5 MG tablet Take 2 tablets by mouth 2 times daily (with meals). 360 tablet 2   • dilTIAZem (CARDIZEM CD) 120 MG 24 hr capsule Take 1 capsule by mouth 2 times daily. 180 capsule 2   • allopurinol (ZYLOPRIM) 300 MG tablet TAKE 1 TABLET BY MOUTH  DAILY. TAKE FOR GOUT 90 tablet 3   • insulin lispro protamine-insulin lispro (HUMALOG MIX 75/25 KWIKPEN) (75-25) 100 UNIT/ML pen-injector 20 units QAM AND 18 units QPM AC  Dx  E11.9-LOWER DOSE 15 mL 5    • triamcinolone (ARISTOCORT) 0.1 % cream Apply bid to rash for 2-3 weeks then prn 80 g 1   • hydrALAZINE (APRESOLINE) 25 MG tablet TAKE 1 TABLET BY MOUTH 3  TIMES A  tablet 2   • isosorbide mononitrate (IMDUR) 60 MG 24 hr tablet TAKE 1 TABLET BY MOUTH  DAILY 90 tablet 2   • atorvastatin (LIPITOR) 80 MG tablet TAKE 1 TABLET BY MOUTH ON  TUESDAY, THURSDAY, AND  SATURDAY 39 tablet 2   • ONE TOUCH ULTRA TEST test strip Test blood sugars four times daily. 150 each 1   • tiZANidine (ZANAFLEX) 2 MG tablet TAKE 1 TO 2 TABLETS BY  MOUTH EVERY NIGHT AT  BEDTIME 180 tablet 1   • iron polysaccharide complex (POLY-IRON 150) 150 MG capsule Take 1 capsule by mouth 2 times daily. 180 capsule 1   • mupirocin (BACTROBAN) 2 % ointment Apply to to affected area BID (Patient taking differently: Apply to to affected area BID prn) 22 g 2   • Cholecalciferol (VITAMIN D) 2000 UNITS tablet Take 2,000 Units by mouth daily. 30 tablet 3   • Ascorbic Acid (VITAMIN C) 500 MG tablet Take 1 tablet by mouth daily.     • Thiamine HCl (VITAMIN B-1) 100 MG tablet Take 100 mg by mouth daily.      • aspirin 81 MG tablet Take 81 mg by mouth daily.       Current Facility-Administered Medications   Medication   • sodium citrate anticoagulant 4 % flush 3 mL   • sodium chloride (NORMAL SALINE) 0.9 % bolus 200 mL   • albumin human (SPA) 25 % injection 12.5 g   • albumin human (SPA) 25 % injection 12.5 g   • lidocaine-prilocaine (EMLA) cream 1 application   • insulin aspart (NovoLOG MIX) 70-30 (100 UNIT/ML) injection 15 Units   • sodium chloride (PF) 0.9 % injection 2 mL   • sodium chloride (PF) 0.9 % injection 2 mL   • heparin (porcine) injection 5,000 Units   • carvedilol (COREG) tablet 25 mg   • dilTIAZem (TIAZAC,CARDIZEM CD) 24 hr capsule 120 mg   • allopurinol (ZYLOPRIM) tablet 300 mg   • atorvastatin (LIPITOR) tablet 80 mg   • hydrALAZINE (APRESOLINE) tablet 25 mg   • isosorbide mononitrate (IMDUR) ER tablet 60 mg   • tiZANidine (ZANAFLEX) tablet  2 mg   • aspirin (ECOTRIN) enteric coated tablet 81 mg   • polyethylene glycol (GLYCOLAX, MIRALAX) packet 17 g   • furosemide (LASIX) tablet 80 mg   • dextrose 50 % injection 25 g   • dextrose 5 % infusion   • glucagon (GLUCAGEN) injection 1 mg   • dextrose (GLUTOSE) 40 % gel 15 g   • insulin lispro (HumaLOG) sliding scale injection     Current Medications Reviewed: Yes  ALLERGIES:   Allergen Reactions   • Lisinopril [Fd&C Blue #2 [place]-Lisinopril]      Hyperkalemia        Current Allergies Reviewed:Yes    Review of Systems:  No fevers overnight  No chest pain or shortness of breath    Pregnancy Status: not possible   Last menstrual period: No LMP for male patient.    Laboratory Results:  Lab Results   Component Value Date    SODIUM 134 (L) 04/16/2018    POTASSIUM 4.8 04/16/2018     (H) 04/16/2018    CREATININE 5.58 (H) 04/16/2018    WBC 7.7 04/14/2018    HCT 33.7 (L) 04/14/2018    HGB 11.2 (L) 04/14/2018    PLT 73 (L) 04/14/2018    INR 1.1 09/05/2017    BILIRUBIN 1.2 (H) 04/13/2018    FBGN 583 (H) 06/16/2014     12/14/2015    MMB 0.8 12/14/2015    RAPDTR 0.04 09/04/2017     (H) 09/04/2017    GLUCOSE 186 (H) 04/16/2018    TSH 0.734 09/05/2017    MG 3.0 (H) 04/13/2018       Lab Results Reviewed: Yes    PHYSICAL ASSESSMENT  Vital signs in last 24 hours:  Temp:  [97.9 °F (36.6 °C)-98.5 °F (36.9 °C)] 98 °F (36.7 °C)  Pulse:  [68-93] 86  Resp:  [14-18] 18  BP: (106-149)/(57-81) 108/81    Cardiac: RR  HEENT: II (hard and soft palate, upper portion of tonsils anduvula visible)  Respiratory: unlabored ORA  Neurological:  alert and coherent   Vascular:  warm and acyanotic    ASA score: ASA 2: Normal patient with mild systemic disease    History of Sedation: No complications    Sleep Apnea: No      Plan for sedation was discussed with the patient: minimal/moderate sedation    Planned Procedure:  Tunneled dialysis catheter placement    The procedure, risks(including bleeding, infection, damage to  structures requiring surgery to repair), benefits, and alternatives were discussed with the patient who gives consent to proceed: Yes    A/P (DPI):  Darion Buckner is a 82 year old male with ESRD and non-functioning fistula here for dialysis catheter placement.      Assessing Physician: Jenna Renteria MD                        Time: 2:35 PM

## 2018-04-27 ENCOUNTER — TELEPHONE (OUTPATIENT)
Dept: FAMILY MEDICINE CLINIC | Facility: CLINIC | Age: 63
End: 2018-04-27

## 2018-04-27 NOTE — TELEPHONE ENCOUNTER
PT STATED THAT THE ORDER FOR HER DIAGNOSTIC SCREENING WAS SENT TO [facility], AND SHE NEEDED IT TO GO TO [facility] BECAUSE ALL OF HER PREVIOUS RECORDS ARE THERE.  PT JUST FOLLOWING UP TO SEE IF IT HAD BEEN DONE SO SHE COULD MAKE HER APPOINTMENT

## 2018-05-10 ENCOUNTER — OFFICE VISIT (OUTPATIENT)
Dept: SURGERY | Facility: CLINIC | Age: 63
End: 2018-05-10

## 2018-05-10 VITALS — BODY MASS INDEX: 27.47 KG/M2 | HEART RATE: 78 BPM | WEIGHT: 175 LBS | OXYGEN SATURATION: 98 % | HEIGHT: 67 IN

## 2018-05-10 DIAGNOSIS — K40.90 RIGHT INGUINAL HERNIA: Primary | ICD-10-CM

## 2018-05-10 PROCEDURE — 99203 OFFICE O/P NEW LOW 30 MIN: CPT | Performed by: SURGERY

## 2018-05-10 RX ORDER — ASPIRIN 81 MG/1
81 TABLET, CHEWABLE ORAL DAILY
COMMUNITY
End: 2019-11-22

## 2018-05-11 NOTE — PROGRESS NOTES
SUMMARY (A/P):    62-year-old lady with symptomatic right inguinal hernia.  She wishes to proceed with laparoscopic right inguinal hernia repair and will call to schedule when she is further recovered from her recent shoulder surgery.  She understands the nature of the procedure and the risks including but not limited to bleeding, infection, use of mesh, and recurrence.      CC:    Referred for consultation by Dr. Young regarding right inguinal pain    HPI:    62-year-old lady presents with moderate burning type pain in the right inguinal region that is worse with lifting and started in February 2017 when she sustained injuries related to a fall from a flight of stairs.    PSH:    -Right shoulder surgery  -Bilateral total knee replacement  -Hysterectomy  -Appendectomy  -Cholecystectomy  -EGD and colonoscopy 2018 Dr. Ureña  -Right breast lumpectomy 2005    PMH:   Fibromyalgia  Rheumatoid arthritis  Anxiety  Hypothyroidism  Hypertension  Right breast cancer      FAMILY HISTORY:    Mother with breast and colon cancer    SOCIAL HISTORY:   Denies tobacco use  Occasional alcohol use    ALLERGIES: reviewed, in Epic    MEDICATIONS: reviewed, in Epic    ROS:  No chest pain or shortness of air.  All other systems reviewed and negative other than presenting complaints.    PHYSICAL EXAM:   Constitutional: Well-developed well-nourished, no acute distress  Vital signs: Heart rate 78, weight 175 pounds, height 67 inches, BMI 27.4  Eyes: Conjunctiva normal, sclera nonicteric  ENMT: Hearing grossly normal, oral mucosa moist  Neck: Supple, no palpable mass, normal thyroid, trachea midline  Respiratory: Clear to auscultation, normal inspiratory effort  Cardiovascular: Regular rate, no murmur, no carotid bruit, no peripheral edema, no jugular venous distention  Gastrointestinal: Soft, nontender, no palpable mass, no hepatosplenomegaly, negative for hernia, bowel sounds normal  Genitourinary: Left inguinal region is normal.  Right  inguinal region remarkable for small reducible moderately tender right inguinal hernia  Lymphatics (palpable nodes):  cervical-negative, axillary-negative, inguinal-negative  Skin:  Warm, dry, no rash on visualized skin surfaces  Musculoskeletal: Symmetric strength, normal gait  Psychiatric: Alert and oriented ×3, normal affect     EMANI COVARRUBIAS M.D.

## 2018-05-18 ENCOUNTER — TELEPHONE (OUTPATIENT)
Dept: FAMILY MEDICINE CLINIC | Facility: CLINIC | Age: 63
End: 2018-05-18

## 2018-05-18 RX ORDER — ESCITALOPRAM OXALATE 10 MG/1
15 TABLET ORAL DAILY
Qty: 135 TABLET | Refills: 3 | Status: SHIPPED | OUTPATIENT
Start: 2018-05-18 | End: 2019-07-15 | Stop reason: SDUPTHER

## 2018-05-18 NOTE — TELEPHONE ENCOUNTER
REFILL ON escitalopram (LEXAPRO) 10 MG tablet DR. CABRALES  INCREASED DOSAGE TO 15 MG. PATIENT NEEDS NEW RX CALLED IN

## 2018-05-30 ENCOUNTER — PREP FOR SURGERY (OUTPATIENT)
Dept: OTHER | Facility: HOSPITAL | Age: 63
End: 2018-05-30

## 2018-05-30 ENCOUNTER — TELEPHONE (OUTPATIENT)
Dept: SURGERY | Facility: CLINIC | Age: 63
End: 2018-05-30

## 2018-05-30 DIAGNOSIS — K40.90 RIGHT INGUINAL HERNIA: Primary | ICD-10-CM

## 2018-05-30 RX ORDER — CEFAZOLIN SODIUM 2 G/100ML
2 INJECTION, SOLUTION INTRAVENOUS ONCE
Status: CANCELLED | OUTPATIENT
Start: 2018-05-30 | End: 2018-05-30

## 2018-06-04 ENCOUNTER — OFFICE VISIT (OUTPATIENT)
Dept: SURGERY | Facility: CLINIC | Age: 63
End: 2018-06-04

## 2018-06-04 VITALS — HEART RATE: 68 BPM | OXYGEN SATURATION: 98 % | BODY MASS INDEX: 26.43 KG/M2 | HEIGHT: 67 IN | WEIGHT: 168.4 LBS

## 2018-06-04 DIAGNOSIS — K40.90 RIGHT INGUINAL HERNIA: Primary | ICD-10-CM

## 2018-06-04 PROCEDURE — 99213 OFFICE O/P EST LOW 20 MIN: CPT | Performed by: SURGERY

## 2018-06-04 RX ORDER — CEFAZOLIN SODIUM 2 G/100ML
2 INJECTION, SOLUTION INTRAVENOUS ONCE
Status: CANCELLED | OUTPATIENT
Start: 2018-06-06 | End: 2018-06-06

## 2018-06-04 NOTE — PROGRESS NOTES
Cc: Right inguinal pain    History of presenting illness:   This is a nice, 62-year-old lady seen by Dr. Dong recently was unable to schedule her for an inguinal hernia repair in the time frame she desired and so she is seeing me.  She says that she has had pain in the right groin going back over a year, but over the last several months it is grown worse.  She describes it as a burning type pain that is worse with lifting.    Past Medical History: Fibromyalgia, rheumatoid arthritis, anxiety, hypertension, hypothyroidism, right breast cancer    Past Surgical History: Right shoulder surgery, bilateral knee replacement, repair femoral fracture, hysterectomy, appendectomy, cholecystectomy, recent upper and lower endoscopy, history of right breast lumpectomy    Medications: Reviewed and reconciled with in Epic    Allergies: Codeine, Percocet    Social History: Denies tobacco use.  Rarely uses alcohol.    Family History: Positive for mother with colon cancer    Review of Systems:  Constitutional: Positive for decreased energy, negative for fever or change in weight  Neck: no swollen glands or dysphagia or odynophagia  Respiratory: negative for SOB, cough, hemoptysis or wheezing  Cardiovascular: negative for chest pain, palpitations or peripheral edema  Gastrointestinal: Positive for right groin pain, positive for occasional constipation, negative for rectal bleeding      Physical Exam:    General: alert and oriented, appropriate, no acute distress  Neck: Supple without lymphadenopathy or thyromegaly, trachea is in the midline  Respiratory: Lungs are clear bilaterally without wheezing, no use of accessory muscles is noted  Cardiovascular: Regular rate and rhythm without murmur, no peripheral edema  Gastrointestinal: Soft, benign, no ventral hernia, bowel sounds positive  Genitourinary: Normal female external genitalia.  Positive confirmed reducible right inguinal hernia.  No clear left inguinal hernia  felt.    Laboratory data: None    Imaging data: None      Assessment and plan:   Symptomatic right inguinal hernia, reducible  Options discussed with patient.  She wishes to go ahead with surgical repair.  She has chosen a minimally invasive laparoscopic approach with da Mike robot assistance.  She understands that mesh would be used.  She understands that risks would include bleeding, infection, chronic pain, recurrence and injury to intra-abdominal structures and is willing to proceed.      Han Torres MD, FACS  General, Minimally Invasive and Endoscopic Surgery  [facility]    [address] [place] [address], [address]  [place], [place], [address]  P: [phone]  F: [phone]

## 2018-06-05 ENCOUNTER — APPOINTMENT (OUTPATIENT)
Dept: PREADMISSION TESTING | Facility: HOSPITAL | Age: 63
End: 2018-06-05

## 2018-06-05 VITALS
TEMPERATURE: 98.7 F | HEIGHT: 66 IN | BODY MASS INDEX: 27.32 KG/M2 | HEART RATE: 71 BPM | WEIGHT: 170 LBS | SYSTOLIC BLOOD PRESSURE: 111 MMHG | RESPIRATION RATE: 20 BRPM | DIASTOLIC BLOOD PRESSURE: 77 MMHG | OXYGEN SATURATION: 99 %

## 2018-06-05 DIAGNOSIS — K40.90 RIGHT INGUINAL HERNIA: ICD-10-CM

## 2018-06-05 LAB
ANION GAP SERPL CALCULATED.3IONS-SCNC: 13.5 MMOL/L
BASOPHILS # BLD AUTO: 0.04 10*3/MM3 (ref 0–0.2)
BASOPHILS NFR BLD AUTO: 0.7 % (ref 0–1.5)
BUN BLD-MCNC: 10 MG/DL (ref 8–23)
BUN/CREAT SERPL: 14.5 (ref 7–25)
CALCIUM SPEC-SCNC: 9.6 MG/DL (ref 8.6–10.5)
CHLORIDE SERPL-SCNC: 99 MMOL/L (ref 98–107)
CO2 SERPL-SCNC: 25.5 MMOL/L (ref 22–29)
CREAT BLD-MCNC: 0.69 MG/DL (ref 0.57–1)
DEPRECATED RDW RBC AUTO: 48.2 FL (ref 37–54)
EOSINOPHIL # BLD AUTO: 0.56 10*3/MM3 (ref 0–0.7)
EOSINOPHIL NFR BLD AUTO: 9.9 % (ref 0.3–6.2)
ERYTHROCYTE [DISTWIDTH] IN BLOOD BY AUTOMATED COUNT: 13.9 % (ref 11.7–13)
GFR SERPL CREATININE-BSD FRML MDRD: 86 ML/MIN/1.73
GLUCOSE BLD-MCNC: 88 MG/DL (ref 65–99)
HCT VFR BLD AUTO: 38.9 % (ref 35.6–45.5)
HGB BLD-MCNC: 12.8 G/DL (ref 11.9–15.5)
IMM GRANULOCYTES # BLD: 0.01 10*3/MM3 (ref 0–0.03)
IMM GRANULOCYTES NFR BLD: 0.2 % (ref 0–0.5)
LYMPHOCYTES # BLD AUTO: 2.13 10*3/MM3 (ref 0.9–4.8)
LYMPHOCYTES NFR BLD AUTO: 37.8 % (ref 19.6–45.3)
MCH RBC QN AUTO: 31.1 PG (ref 26.9–32)
MCHC RBC AUTO-ENTMCNC: 32.9 G/DL (ref 32.4–36.3)
MCV RBC AUTO: 94.6 FL (ref 80.5–98.2)
MONOCYTES # BLD AUTO: 0.42 10*3/MM3 (ref 0.2–1.2)
MONOCYTES NFR BLD AUTO: 7.5 % (ref 5–12)
NEUTROPHILS # BLD AUTO: 2.48 10*3/MM3 (ref 1.9–8.1)
NEUTROPHILS NFR BLD AUTO: 44.1 % (ref 42.7–76)
PLATELET # BLD AUTO: 245 10*3/MM3 (ref 140–500)
PMV BLD AUTO: 9 FL (ref 6–12)
POTASSIUM BLD-SCNC: 4.9 MMOL/L (ref 3.5–5.2)
RBC # BLD AUTO: 4.11 10*6/MM3 (ref 3.9–5.2)
SODIUM BLD-SCNC: 138 MMOL/L (ref 136–145)
WBC NRBC COR # BLD: 5.63 10*3/MM3 (ref 4.5–10.7)

## 2018-06-05 PROCEDURE — 93005 ELECTROCARDIOGRAM TRACING: CPT

## 2018-06-05 PROCEDURE — 36415 COLL VENOUS BLD VENIPUNCTURE: CPT

## 2018-06-05 PROCEDURE — 80048 BASIC METABOLIC PNL TOTAL CA: CPT | Performed by: SURGERY

## 2018-06-05 PROCEDURE — 85025 COMPLETE CBC W/AUTO DIFF WBC: CPT | Performed by: SURGERY

## 2018-06-05 PROCEDURE — 93010 ELECTROCARDIOGRAM REPORT: CPT | Performed by: INTERNAL MEDICINE

## 2018-06-05 NOTE — DISCHARGE INSTRUCTIONS
Take the following medications the morning of surgery with a small sip of water:  OMEPRAZOLE      General Instructions:  • Do not eat solid food after midnight the night before surgery.  • You may drink clear liquids day of surgery but must stop at least one hour before your hospital arrival time.  • It is beneficial for you to have a clear drink that contains carbohydrates the day of surgery.  We suggest a 12 to 20 ounce bottle of Gatorade or Powerade for non-diabetic patients or a 12 to 20 ounce bottle of G2 or Powerade Zero for diabetic patients. (Pediatric patients, are not advised to drink a 12 to 20 ounce carbohydrate drink)    Clear liquids are liquids you can see through.  Nothing red in color.     Plain water                               Sports drinks  Sodas                                   Gelatin (Jell-O)  Fruit juices without pulp such as white grape juice and apple juice  Popsicles that contain no fruit or yogurt  Tea or coffee (no cream or milk added)  Gatorade / Powerade  G2 / Powerade Zero    • Infants may have breast milk up to four hours before surgery.  • Infants drinking formula may drink formula up to six hours before surgery.   • Patients who avoid smoking, chewing tobacco and alcohol for 4 weeks prior to surgery have a reduced risk of post-operative complications.  Quit smoking as many days before surgery as you can.  • Do not smoke, use chewing tobacco or drink alcohol the day of surgery.   • If applicable bring your C-PAP/ BI-PAP machine.  • Bring any papers given to you in the doctor’s office.  • Wear clean comfortable clothes and socks.  • Do not wear contact lenses or make-up.  Bring a case for your glasses.   • Bring crutches or walker if applicable.  • Remove all piercings.  Leave jewelry and any other valuables at home.  • Hair extensions with metal clips must be removed prior to surgery.  • The Pre-Admission Testing nurse will instruct you to bring medications if unable to obtain an  accurate list in Pre-Admission Testing.        If you were given a blood bank ID arm band remember to bring it with you the day of surgery.    Preventing a Surgical Site Infection:  • For 2 to 3 days before surgery, avoid shaving with a razor because the razor can irritate skin and make it easier to develop an infection.  • The night prior to surgery sleep in a clean bed with clean clothing.  Do not allow pets to sleep with you.  • Shower on the morning of surgery using a fresh bar of anti-bacterial soap (such as Dial) and clean washcloth.  Dry with a clean towel and dress in clean clothing.  • Ask your surgeon if you will be receiving antibiotics prior to surgery.  • Make sure you, your family, and all healthcare providers clean their hands with soap and water or an alcohol based hand  before caring for you or your wound.    Day of surgery:  6/6/2018 ARRIVAL 11:00 AM  Upon arrival, a Pre-op nurse and Anesthesiologist will review your health history, obtain vital signs, and answer questions you may have.  The only belongings needed at this time will be your home medications and if applicable your C-PAP/BI-PAP machine.  If you are staying overnight your family can leave the rest of your belongings in the car and bring them to your room later.  A Pre-op nurse will start an IV and you may receive medication in preparation for surgery, including something to help you relax.  Your family will be able to see you in the Pre-op area.  While you are in surgery your family should notify the waiting room  if they leave the waiting room area and provide a contact phone number.    Please be aware that surgery does come with discomfort.  We want to make every effort to control your discomfort so please discuss any uncontrolled symptoms with your nurse.   Your doctor will most likely have prescribed pain medications.      If you are going home after surgery you will receive individualized written care  instructions before being discharged.  A responsible adult must drive you to and from the hospital on the day of your surgery and stay with you for 24 hours.    If you are staying overnight following surgery, you will be transported to your hospital room following the recovery period.  [facility] has all private rooms.    If you have any questions please call Pre-Admission Testing at [phone].  Deductibles and co-payments are collected on the day of service. Please be prepared to pay the required co-pay, deductible or deposit on the day of service as defined by your plan.

## 2018-06-06 ENCOUNTER — HOSPITAL ENCOUNTER (OUTPATIENT)
Facility: HOSPITAL | Age: 63
Setting detail: HOSPITAL OUTPATIENT SURGERY
Discharge: HOME | End: 2018-06-06
Attending: SURGERY | Admitting: SURGERY

## 2018-06-06 ENCOUNTER — ANESTHESIA (OUTPATIENT)
Dept: PERIOP | Facility: HOSPITAL | Age: 63
End: 2018-06-06

## 2018-06-06 ENCOUNTER — ANESTHESIA EVENT (OUTPATIENT)
Dept: PERIOP | Facility: HOSPITAL | Age: 63
End: 2018-06-06

## 2018-06-06 VITALS
WEIGHT: 171.96 LBS | DIASTOLIC BLOOD PRESSURE: 75 MMHG | TEMPERATURE: 97.7 F | SYSTOLIC BLOOD PRESSURE: 120 MMHG | BODY MASS INDEX: 26.99 KG/M2 | RESPIRATION RATE: 16 BRPM | OXYGEN SATURATION: 97 % | HEIGHT: 67 IN | HEART RATE: 67 BPM

## 2018-06-06 DIAGNOSIS — K40.90 RIGHT INGUINAL HERNIA: ICD-10-CM

## 2018-06-06 PROCEDURE — 49650 LAP ING HERNIA REPAIR INIT: CPT | Performed by: PHYSICIAN ASSISTANT

## 2018-06-06 PROCEDURE — 25010000002 NEOSTIGMINE PER 0.5 MG: Performed by: NURSE ANESTHETIST, CERTIFIED REGISTERED

## 2018-06-06 PROCEDURE — 25010000002 MIDAZOLAM PER 1 MG: Performed by: NURSE ANESTHETIST, CERTIFIED REGISTERED

## 2018-06-06 PROCEDURE — 25010000002 ONDANSETRON PER 1 MG: Performed by: NURSE ANESTHETIST, CERTIFIED REGISTERED

## 2018-06-06 PROCEDURE — 49650 LAP ING HERNIA REPAIR INIT: CPT | Performed by: SURGERY

## 2018-06-06 PROCEDURE — 25010000002 HYDROMORPHONE PER 4 MG: Performed by: NURSE ANESTHETIST, CERTIFIED REGISTERED

## 2018-06-06 PROCEDURE — 25010000003 CEFAZOLIN IN DEXTROSE 2-4 GM/100ML-% SOLUTION: Performed by: SURGERY

## 2018-06-06 PROCEDURE — 25010000002 MIDAZOLAM PER 1 MG: Performed by: ANESTHESIOLOGY

## 2018-06-06 PROCEDURE — 25010000002 KETOROLAC TROMETHAMINE PER 15 MG: Performed by: NURSE ANESTHETIST, CERTIFIED REGISTERED

## 2018-06-06 PROCEDURE — 25010000002 DEXAMETHASONE PER 1 MG: Performed by: NURSE ANESTHETIST, CERTIFIED REGISTERED

## 2018-06-06 PROCEDURE — 25010000002 PROPOFOL 10 MG/ML EMULSION: Performed by: NURSE ANESTHETIST, CERTIFIED REGISTERED

## 2018-06-06 PROCEDURE — 25010000002 FENTANYL CITRATE (PF) 100 MCG/2ML SOLUTION: Performed by: NURSE ANESTHETIST, CERTIFIED REGISTERED

## 2018-06-06 PROCEDURE — C1781 MESH (IMPLANTABLE): HCPCS | Performed by: SURGERY

## 2018-06-06 DEVICE — BARD 3DMAX MESH RIGHT LARGE
Type: IMPLANTABLE DEVICE | Status: FUNCTIONAL
Brand: BARD 3DMAX MESH

## 2018-06-06 RX ORDER — DEXAMETHASONE SODIUM PHOSPHATE 10 MG/ML
INJECTION INTRAMUSCULAR; INTRAVENOUS AS NEEDED
Status: DISCONTINUED | OUTPATIENT
Start: 2018-06-06 | End: 2018-06-06 | Stop reason: SURG

## 2018-06-06 RX ORDER — EPHEDRINE SULFATE 50 MG/ML
5 INJECTION, SOLUTION INTRAVENOUS ONCE AS NEEDED
Status: DISCONTINUED | OUTPATIENT
Start: 2018-06-06 | End: 2018-06-06 | Stop reason: HOSPADM

## 2018-06-06 RX ORDER — GLYCOPYRROLATE 0.2 MG/ML
INJECTION INTRAMUSCULAR; INTRAVENOUS AS NEEDED
Status: DISCONTINUED | OUTPATIENT
Start: 2018-06-06 | End: 2018-06-06 | Stop reason: SURG

## 2018-06-06 RX ORDER — PROMETHAZINE HYDROCHLORIDE 25 MG/ML
12.5 INJECTION, SOLUTION INTRAMUSCULAR; INTRAVENOUS ONCE AS NEEDED
Status: DISCONTINUED | OUTPATIENT
Start: 2018-06-06 | End: 2018-06-06 | Stop reason: HOSPADM

## 2018-06-06 RX ORDER — CEFAZOLIN SODIUM 2 G/100ML
2 INJECTION, SOLUTION INTRAVENOUS ONCE
Status: COMPLETED | OUTPATIENT
Start: 2018-06-06 | End: 2018-06-06

## 2018-06-06 RX ORDER — FENTANYL CITRATE 50 UG/ML
50 INJECTION, SOLUTION INTRAMUSCULAR; INTRAVENOUS
Status: DISCONTINUED | OUTPATIENT
Start: 2018-06-06 | End: 2018-06-06 | Stop reason: HOSPADM

## 2018-06-06 RX ORDER — EPHEDRINE SULFATE 50 MG/ML
INJECTION, SOLUTION INTRAVENOUS AS NEEDED
Status: DISCONTINUED | OUTPATIENT
Start: 2018-06-06 | End: 2018-06-06 | Stop reason: SURG

## 2018-06-06 RX ORDER — HYDRALAZINE HYDROCHLORIDE 20 MG/ML
5 INJECTION INTRAMUSCULAR; INTRAVENOUS
Status: DISCONTINUED | OUTPATIENT
Start: 2018-06-06 | End: 2018-06-06 | Stop reason: HOSPADM

## 2018-06-06 RX ORDER — PROMETHAZINE HYDROCHLORIDE 25 MG/1
25 TABLET ORAL ONCE AS NEEDED
Status: DISCONTINUED | OUTPATIENT
Start: 2018-06-06 | End: 2018-06-06 | Stop reason: HOSPADM

## 2018-06-06 RX ORDER — LIDOCAINE HYDROCHLORIDE 20 MG/ML
INJECTION, SOLUTION INFILTRATION; PERINEURAL AS NEEDED
Status: DISCONTINUED | OUTPATIENT
Start: 2018-06-06 | End: 2018-06-06 | Stop reason: SURG

## 2018-06-06 RX ORDER — FLUMAZENIL 0.1 MG/ML
0.2 INJECTION INTRAVENOUS AS NEEDED
Status: DISCONTINUED | OUTPATIENT
Start: 2018-06-06 | End: 2018-06-06 | Stop reason: HOSPADM

## 2018-06-06 RX ORDER — SODIUM CHLORIDE, SODIUM LACTATE, POTASSIUM CHLORIDE, CALCIUM CHLORIDE 600; 310; 30; 20 MG/100ML; MG/100ML; MG/100ML; MG/100ML
9 INJECTION, SOLUTION INTRAVENOUS CONTINUOUS
Status: DISCONTINUED | OUTPATIENT
Start: 2018-06-06 | End: 2018-06-06 | Stop reason: HOSPADM

## 2018-06-06 RX ORDER — FAMOTIDINE 10 MG/ML
20 INJECTION, SOLUTION INTRAVENOUS ONCE
Status: COMPLETED | OUTPATIENT
Start: 2018-06-06 | End: 2018-06-06

## 2018-06-06 RX ORDER — ONDANSETRON 2 MG/ML
INJECTION INTRAMUSCULAR; INTRAVENOUS AS NEEDED
Status: DISCONTINUED | OUTPATIENT
Start: 2018-06-06 | End: 2018-06-06 | Stop reason: SURG

## 2018-06-06 RX ORDER — ROCURONIUM BROMIDE 10 MG/ML
INJECTION, SOLUTION INTRAVENOUS AS NEEDED
Status: DISCONTINUED | OUTPATIENT
Start: 2018-06-06 | End: 2018-06-06 | Stop reason: SURG

## 2018-06-06 RX ORDER — HYDROCODONE BITARTRATE AND ACETAMINOPHEN 7.5; 325 MG/1; MG/1
1 TABLET ORAL EVERY 6 HOURS PRN
Qty: 30 TABLET | Refills: 0 | Status: SHIPPED | OUTPATIENT
Start: 2018-06-06 | End: 2018-07-16 | Stop reason: SDUPTHER

## 2018-06-06 RX ORDER — SODIUM CHLORIDE, SODIUM LACTATE, POTASSIUM CHLORIDE, CALCIUM CHLORIDE 600; 310; 30; 20 MG/100ML; MG/100ML; MG/100ML; MG/100ML
INJECTION, SOLUTION INTRAVENOUS CONTINUOUS PRN
Status: DISCONTINUED | OUTPATIENT
Start: 2018-06-06 | End: 2018-06-06 | Stop reason: SURG

## 2018-06-06 RX ORDER — BUPIVACAINE HYDROCHLORIDE AND EPINEPHRINE 5; 5 MG/ML; UG/ML
INJECTION, SOLUTION PERINEURAL AS NEEDED
Status: DISCONTINUED | OUTPATIENT
Start: 2018-06-06 | End: 2018-06-06 | Stop reason: HOSPADM

## 2018-06-06 RX ORDER — DIPHENHYDRAMINE HYDROCHLORIDE 50 MG/ML
12.5 INJECTION INTRAMUSCULAR; INTRAVENOUS
Status: DISCONTINUED | OUTPATIENT
Start: 2018-06-06 | End: 2018-06-06 | Stop reason: HOSPADM

## 2018-06-06 RX ORDER — LABETALOL HYDROCHLORIDE 5 MG/ML
5 INJECTION, SOLUTION INTRAVENOUS
Status: DISCONTINUED | OUTPATIENT
Start: 2018-06-06 | End: 2018-06-06 | Stop reason: HOSPADM

## 2018-06-06 RX ORDER — SODIUM CHLORIDE 0.9 % (FLUSH) 0.9 %
1-10 SYRINGE (ML) INJECTION AS NEEDED
Status: DISCONTINUED | OUTPATIENT
Start: 2018-06-06 | End: 2018-06-06 | Stop reason: HOSPADM

## 2018-06-06 RX ORDER — ONDANSETRON 2 MG/ML
4 INJECTION INTRAMUSCULAR; INTRAVENOUS ONCE AS NEEDED
Status: DISCONTINUED | OUTPATIENT
Start: 2018-06-06 | End: 2018-06-06 | Stop reason: HOSPADM

## 2018-06-06 RX ORDER — KETOROLAC TROMETHAMINE 30 MG/ML
INJECTION, SOLUTION INTRAMUSCULAR; INTRAVENOUS AS NEEDED
Status: DISCONTINUED | OUTPATIENT
Start: 2018-06-06 | End: 2018-06-06 | Stop reason: SURG

## 2018-06-06 RX ORDER — SCOLOPAMINE TRANSDERMAL SYSTEM 1 MG/1
1 PATCH, EXTENDED RELEASE TRANSDERMAL
Status: DISCONTINUED | OUTPATIENT
Start: 2018-06-06 | End: 2018-06-06 | Stop reason: HOSPADM

## 2018-06-06 RX ORDER — HYDROMORPHONE HYDROCHLORIDE 1 MG/ML
0.5 INJECTION, SOLUTION INTRAMUSCULAR; INTRAVENOUS; SUBCUTANEOUS
Status: DISCONTINUED | OUTPATIENT
Start: 2018-06-06 | End: 2018-06-06 | Stop reason: HOSPADM

## 2018-06-06 RX ORDER — LIDOCAINE HYDROCHLORIDE 10 MG/ML
0.5 INJECTION, SOLUTION EPIDURAL; INFILTRATION; INTRACAUDAL; PERINEURAL ONCE AS NEEDED
Status: DISCONTINUED | OUTPATIENT
Start: 2018-06-06 | End: 2018-06-06 | Stop reason: HOSPADM

## 2018-06-06 RX ORDER — PROMETHAZINE HYDROCHLORIDE 25 MG/1
25 SUPPOSITORY RECTAL ONCE AS NEEDED
Status: DISCONTINUED | OUTPATIENT
Start: 2018-06-06 | End: 2018-06-06 | Stop reason: HOSPADM

## 2018-06-06 RX ORDER — MIDAZOLAM HYDROCHLORIDE 1 MG/ML
1 INJECTION INTRAMUSCULAR; INTRAVENOUS
Status: DISCONTINUED | OUTPATIENT
Start: 2018-06-06 | End: 2018-06-06 | Stop reason: HOSPADM

## 2018-06-06 RX ORDER — HYDROCODONE BITARTRATE AND ACETAMINOPHEN 7.5; 325 MG/1; MG/1
1 TABLET ORAL ONCE AS NEEDED
Status: COMPLETED | OUTPATIENT
Start: 2018-06-06 | End: 2018-06-06

## 2018-06-06 RX ORDER — PROPOFOL 10 MG/ML
VIAL (ML) INTRAVENOUS AS NEEDED
Status: DISCONTINUED | OUTPATIENT
Start: 2018-06-06 | End: 2018-06-06 | Stop reason: SURG

## 2018-06-06 RX ORDER — PROMETHAZINE HYDROCHLORIDE 25 MG/1
12.5 TABLET ORAL ONCE AS NEEDED
Status: DISCONTINUED | OUTPATIENT
Start: 2018-06-06 | End: 2018-06-06 | Stop reason: HOSPADM

## 2018-06-06 RX ORDER — NALOXONE HCL 0.4 MG/ML
0.2 VIAL (ML) INJECTION AS NEEDED
Status: DISCONTINUED | OUTPATIENT
Start: 2018-06-06 | End: 2018-06-06 | Stop reason: HOSPADM

## 2018-06-06 RX ORDER — MIDAZOLAM HYDROCHLORIDE 1 MG/ML
2 INJECTION INTRAMUSCULAR; INTRAVENOUS
Status: DISCONTINUED | OUTPATIENT
Start: 2018-06-06 | End: 2018-06-06 | Stop reason: HOSPADM

## 2018-06-06 RX ORDER — MIDAZOLAM HYDROCHLORIDE 1 MG/ML
INJECTION INTRAMUSCULAR; INTRAVENOUS AS NEEDED
Status: DISCONTINUED | OUTPATIENT
Start: 2018-06-06 | End: 2018-06-06 | Stop reason: SURG

## 2018-06-06 RX ORDER — FENTANYL CITRATE 50 UG/ML
INJECTION, SOLUTION INTRAMUSCULAR; INTRAVENOUS AS NEEDED
Status: DISCONTINUED | OUTPATIENT
Start: 2018-06-06 | End: 2018-06-06 | Stop reason: SURG

## 2018-06-06 RX ADMIN — MIDAZOLAM 2 MG: 1 INJECTION INTRAMUSCULAR; INTRAVENOUS at 12:13

## 2018-06-06 RX ADMIN — DEXAMETHASONE SODIUM PHOSPHATE 8 MG: 10 INJECTION INTRAMUSCULAR; INTRAVENOUS at 13:34

## 2018-06-06 RX ADMIN — ONDANSETRON 4 MG: 2 INJECTION INTRAMUSCULAR; INTRAVENOUS at 14:32

## 2018-06-06 RX ADMIN — FENTANYL CITRATE 50 MCG: 50 INJECTION INTRAMUSCULAR; INTRAVENOUS at 15:05

## 2018-06-06 RX ADMIN — SODIUM CHLORIDE, POTASSIUM CHLORIDE, SODIUM LACTATE AND CALCIUM CHLORIDE: 600; 310; 30; 20 INJECTION, SOLUTION INTRAVENOUS at 13:00

## 2018-06-06 RX ADMIN — ROCURONIUM BROMIDE 50 MG: 10 INJECTION INTRAVENOUS at 13:10

## 2018-06-06 RX ADMIN — HYDROMORPHONE HYDROCHLORIDE 0.5 MG: 1 INJECTION, SOLUTION INTRAMUSCULAR; INTRAVENOUS; SUBCUTANEOUS at 15:58

## 2018-06-06 RX ADMIN — FENTANYL CITRATE 50 MCG: 50 INJECTION, SOLUTION INTRAMUSCULAR; INTRAVENOUS at 14:44

## 2018-06-06 RX ADMIN — HYDROCODONE BITARTRATE AND ACETAMINOPHEN 1 TABLET: 7.5; 325 TABLET ORAL at 15:15

## 2018-06-06 RX ADMIN — SODIUM CHLORIDE, POTASSIUM CHLORIDE, SODIUM LACTATE AND CALCIUM CHLORIDE 9 ML/HR: 600; 310; 30; 20 INJECTION, SOLUTION INTRAVENOUS at 12:13

## 2018-06-06 RX ADMIN — FENTANYL CITRATE 100 MCG: 50 INJECTION, SOLUTION INTRAMUSCULAR; INTRAVENOUS at 13:07

## 2018-06-06 RX ADMIN — SODIUM CHLORIDE, POTASSIUM CHLORIDE, SODIUM LACTATE AND CALCIUM CHLORIDE: 600; 310; 30; 20 INJECTION, SOLUTION INTRAVENOUS at 12:07

## 2018-06-06 RX ADMIN — FENTANYL CITRATE 50 MCG: 50 INJECTION INTRAMUSCULAR; INTRAVENOUS at 14:56

## 2018-06-06 RX ADMIN — FAMOTIDINE 20 MG: 10 INJECTION INTRAVENOUS at 12:13

## 2018-06-06 RX ADMIN — GLYCOPYRROLATE 0.6 MG: 0.2 INJECTION INTRAMUSCULAR; INTRAVENOUS at 14:32

## 2018-06-06 RX ADMIN — HYDROMORPHONE HYDROCHLORIDE 0.5 MG: 1 INJECTION, SOLUTION INTRAMUSCULAR; INTRAVENOUS; SUBCUTANEOUS at 15:28

## 2018-06-06 RX ADMIN — Medication 2 MG: at 14:50

## 2018-06-06 RX ADMIN — FENTANYL CITRATE 50 MCG: 50 INJECTION, SOLUTION INTRAMUSCULAR; INTRAVENOUS at 13:35

## 2018-06-06 RX ADMIN — PROPOFOL 200 MG: 10 INJECTION, EMULSION INTRAVENOUS at 13:10

## 2018-06-06 RX ADMIN — Medication 2 MG: at 13:01

## 2018-06-06 RX ADMIN — LIDOCAINE HYDROCHLORIDE 100 MG: 20 INJECTION, SOLUTION INFILTRATION; PERINEURAL at 13:10

## 2018-06-06 RX ADMIN — EPHEDRINE SULFATE 10 MG: 50 INJECTION INTRAMUSCULAR; INTRAVENOUS; SUBCUTANEOUS at 13:29

## 2018-06-06 RX ADMIN — NEOSTIGMINE METHYLSULFATE 4 MG: 1 INJECTION INTRAMUSCULAR; INTRAVENOUS; SUBCUTANEOUS at 14:32

## 2018-06-06 RX ADMIN — FENTANYL CITRATE 50 MCG: 50 INJECTION, SOLUTION INTRAMUSCULAR; INTRAVENOUS at 13:10

## 2018-06-06 RX ADMIN — SCOPALAMINE 1 PATCH: 1 PATCH, EXTENDED RELEASE TRANSDERMAL at 12:13

## 2018-06-06 RX ADMIN — CEFAZOLIN SODIUM 2 G: 2 INJECTION, SOLUTION INTRAVENOUS at 13:00

## 2018-06-06 RX ADMIN — KETOROLAC TROMETHAMINE 30 MG: 30 INJECTION, SOLUTION INTRAMUSCULAR; INTRAVENOUS at 14:32

## 2018-06-06 NOTE — OP NOTE
Operative Note :   Han Torres MD    Megan KENT Gaudencio  1955    Procedure Date: 06/06/18    Pre-op Diagnosis:  Right inguinal hernia [K40.90]    Post-Op Diagnosis:  Same    Procedure:   · Laparoscopic right inguinal hernia repair with da Mike robot assistance    Surgeon: Han Torres MD    Assistant: Maribell HAMLIN    Anesthesia:  General (general endotracheal tube)    EBL:   minimal    Specimens:   None    Indications:  · 62-year-old female with a symptomatic right inguinal hernia    Findings:   · Indirect right inguinal hernia  · No defect seen on the left side    Recommendations:   · Routine postoperative care    Description of procedure:    After obtaining informed consent, patient brought to the operating room and was placed under a general anesthetic.  Raygoza catheter was placed.  Patient's abdomen was sterilely prepped and draped.  Supraumbilical vertically oriented incision was made and carried through the skin and subcutaneous tissues.  The fascia was incised and lifted up.  Peritoneum was bluntly penetrated.  A 12 mm trocar was inserted and the abdomen was insufflated with CO2.  Cursory examination of the abdomen was unremarkable.  No inflammatory changes.  No significant adhesions were noted.  Indirect right inguinal hernia was identified.  No hernia seen on the left side.  Additional 8 mm robotic trochars introduced about 8 cm lateral to the midline on either side of the abdomen.  Patient was put in a slight Trendelenburg position.  The robot was then brought up on the patient's right side and parallel fashion and docked.  Camera was inserted.  Under direct visualization the Cadiere forceps were brought in on the left and the monopolar scissors on the right.  I then incised the peritoneum just above and medial to the anterior superior iliac spine and carried this peritoneal incision across to the midline at the level of the median umbilical ligament.  I then dissected along the medial aspect  of this preperitoneal space until I got down onto Iron's ligament medially.  I completed a wide dissection medially to a space for mesh.  I then dissected lateral to the hernia in the preperitoneal space.  I then began peeling the peritoneum down off of the round ligament.  There was a moderate sized cord lipoma associated with the round ligament.  The round ligament and genital branch of the genitofemoral nerve were then divided removing all contents of the inguinal canal.  The peritoneum was peeled down completely allowing for the mesh to sit neatly.  I then introduced a large Bard 11.6 x 16 cm preshaped rolling 3-D mesh and it was introduced into the abdominal cavity.  I oriented it appropriately.  It was secured to his ligament with a 2-0 Vicryl suture.  I then secured it to the anterior abdominal wall with 2-0 Vicryl suture both medial and lateral to the epigastric vessels.  The peritoneum was lifted up and I ensured that it did not interfere with the inferior aspect of the mesh.  I then closed the peritoneum with a running barbed 2-0 Monocryl suture.  The needles were removed.  Instruments were withdrawn.  The robot was undocked.  Laparoscope was then reinserted and the trochars were removed under direct visualization.  Abdomen was desufflated.  The supraumbilical fascial incision was reapproximated with #1 Vicryl suture.  Skin incisions were closed with 4-0 Monocryl.  Sterile dressings were applied.  Patient tolerated the procedure well.    Han Torres MD  General and Endoscopic Surgery  [facility]    [address], [address]  [place], [place], [address]  P: [phone]  F: [phone]

## 2018-06-06 NOTE — DISCHARGE INSTRUCTIONS
Discharge Instructions for Hernia Surgery      1. Go home, rest and take it easy today; however, you should get up and move about several times today to reduce the risk of developing a clot in your legs.      2. You may experience some dizziness or memory loss from the anesthesia.  This may last for the next 24 hours.  Someone should plan on staying with you for the first 24 hours for your safety.    3. Do not make any important legal decisions or sign any legal papers for the next 24 hours.      4. Eat and drink lightly today.  Start off with liquids, jello, soup, crackers or other bland foods at first. You may advance your diet tomorrow as tolerated as long as you do not experience any nausea or vomiting.     5. You may remove your outer dressings in 2 days.  The white tapes called steri-strips should stay in place.  They will fall off on their own in 1-2 weeks.  Do not worry if they come off sooner.      6. You may notice some bleeding/drainage on your outer dressings. A little bloody drainage is normal. If the bleeding/drainage is such that the bandage cannot absorb it, remove the dressing, apply clean gauze and apply firm pressure for a full 15 minutes.  If the bleeding continues, please call me.    7. You may shower tomorrow.  No tub baths until your incisions are completely healed.     8. No lifting > 10 lbs. until you are seen at your follow-up visit.         9. You have received a prescription for a narcotic pain medicine, as you will have some pain following surgery.   You will not be totally pain free, but your pain medicine should make the pain tolerable.  Please take your pain medicine as prescribed and always take your pills with food to prevent nausea. If you are having severe pain that cannot be controlled by the pain medicine, please contact me.      10. If you had a laparoscopic surgery, it is not unusual to experience pain/discomfort in your shoulders or under your ribs after surgery.  It is  from the gas used during the laparoscopic procedure and usually lasts 1-3 days.  The prescription pain medicine is used to treat the surgical pain and does not typically alleviate this “gassy” pain.     11. No driving for 24 hours and for as long as you are taking your prescription pain medicine.      12. You will need to call the office at [phone] to schedule a follow-up appointment in 2 weeks.           13. Remember to contact me for any of the following:    • Fever > 101 degrees  • Severe pain that cannot be controlled by taking your pain pills  • Severe nausea or vomiting   • Significant bleeding of your incisions  • Drainage that has a bad smell or is yellow or green in appearance  • Any other questions or concerns        Additional Instruction for Inguinal Hernia Patients Only    1. If you did not urinate at the hospital after your surgery or if you feel the need to urinate and cannot, this will necessitate a return to the Emergency Room for placement of a urinary catheter.  You should also notify me as well.  As a rule, you should be able to empty your bladder within 4-6 hours after discharge from the hospital.      You may notice some scrotal bruising and/or swelling. A scrotal support or briefs as well as ice packs may be used to alleviate discomfort

## 2018-06-06 NOTE — ANESTHESIA PROCEDURE NOTES
Airway  Urgency: elective    Date/Time: 6/6/2018 1:13 PM  Airway not difficult    General Information and Staff    Patient location during procedure: OR  Anesthesiologist: CHARLA ANGEL  CRNA: MALICK LAMAR    Indications and Patient Condition  Indications for airway management: airway protection    Preoxygenated: yes  MILS not maintained throughout  Mask difficulty assessment: 1 - vent by mask    Final Airway Details  Final airway type: endotracheal airway      Successful airway: ETT  Cuffed: yes   Successful intubation technique: direct laryngoscopy  Endotracheal tube insertion site: oral  Blade: Blossom  Blade size: #3  ETT size: 7.0 mm  Cormack-Lehane Classification: grade IIa - partial view of glottis  Placement verified by: chest auscultation and capnometry   Cuff volume (mL): 7  Measured from: lips  ETT to lips (cm): 21  Number of attempts at approach: 1    Additional Comments  Pt preoxygenated prior to induction, easy mask airway, atraumatic intubation,+ ETCO2, + bs bilat,  ETT secured and connected to ventilator.

## 2018-06-06 NOTE — ANESTHESIA PREPROCEDURE EVALUATION
Anesthesia Evaluation     Patient summary reviewed and Nursing notes reviewed   history of anesthetic complications: PONV               Airway   Mallampati: II  Dental      Pulmonary - negative pulmonary ROS   Cardiovascular     ECG reviewed  Rhythm: regular  Rate: normal    (+) hypertension,       Neuro/Psych- negative ROS  GI/Hepatic/Renal/Endo    (+)   hypothyroidism,     Musculoskeletal     Abdominal    Substance History - negative use     OB/GYN negative ob/gyn ROS         Other   (+) arthritis   history of cancer                    Anesthesia Plan    ASA 2     general     intravenous induction   Anesthetic plan and risks discussed with patient.

## 2018-06-06 NOTE — ANESTHESIA POSTPROCEDURE EVALUATION
Patient: Megan Ratliff    Procedure Summary     Date:  06/06/18 Room / Location:  [facility] OR  / [facility] MAIN OR    Anesthesia Start:  1300 Anesthesia Stop:  1456    Procedure:  right INGUINAL HERNIA REPAIR LAPAROSCOPIC WITH DAVINCI ROBOT (Right Abdomen) Diagnosis:       Right inguinal hernia      (Right inguinal hernia [K40.90])    Surgeon:  Han Torres MD Provider:  Josef Hall MD    Anesthesia Type:  general ASA Status:  2          Anesthesia Type: general  Last vitals  BP   117/76 (06/06/18 1541)   Temp   36.5 °C (97.7 °F) (06/06/18 1530)   Pulse   55 (06/06/18 1541)   Resp   16 (06/06/18 1541)     SpO2   96 % (06/06/18 1541)     Post Anesthesia Care and Evaluation    Patient location during evaluation: PACU  Patient participation: complete - patient participated  Level of consciousness: awake and alert  Pain management: adequate  Airway patency: patent  Anesthetic complications: No anesthetic complications    Cardiovascular status: acceptable  Respiratory status: acceptable  Hydration status: acceptable    Comments: --------------------            06/06/18 1541     --------------------   BP:       117/76     Pulse:      55       Resp:       16       Temp:                SpO2:      96%      --------------------

## 2018-06-06 NOTE — H&P (VIEW-ONLY)
Cc: Right inguinal pain    History of presenting illness:   This is a nice, 62-year-old lady seen by Dr. Dong recently was unable to schedule her for an inguinal hernia repair in the time frame she desired and so she is seeing me.  She says that she has had pain in the right groin going back over a year, but over the last several months it is grown worse.  She describes it as a burning type pain that is worse with lifting.    Past Medical History: Fibromyalgia, rheumatoid arthritis, anxiety, hypertension, hypothyroidism, right breast cancer    Past Surgical History: Right shoulder surgery, bilateral knee replacement, repair femoral fracture, hysterectomy, appendectomy, cholecystectomy, recent upper and lower endoscopy, history of right breast lumpectomy    Medications: Reviewed and reconciled with in Epic    Allergies: Codeine, Percocet    Social History: Denies tobacco use.  Rarely uses alcohol.    Family History: Positive for mother with colon cancer    Review of Systems:  Constitutional: Positive for decreased energy, negative for fever or change in weight  Neck: no swollen glands or dysphagia or odynophagia  Respiratory: negative for SOB, cough, hemoptysis or wheezing  Cardiovascular: negative for chest pain, palpitations or peripheral edema  Gastrointestinal: Positive for right groin pain, positive for occasional constipation, negative for rectal bleeding      Physical Exam:    General: alert and oriented, appropriate, no acute distress  Neck: Supple without lymphadenopathy or thyromegaly, trachea is in the midline  Respiratory: Lungs are clear bilaterally without wheezing, no use of accessory muscles is noted  Cardiovascular: Regular rate and rhythm without murmur, no peripheral edema  Gastrointestinal: Soft, benign, no ventral hernia, bowel sounds positive  Genitourinary: Normal female external genitalia.  Positive confirmed reducible right inguinal hernia.  No clear left inguinal hernia  felt.    Laboratory data: None    Imaging data: None      Assessment and plan:   Symptomatic right inguinal hernia, reducible  Options discussed with patient.  She wishes to go ahead with surgical repair.  She has chosen a minimally invasive laparoscopic approach with da Mike robot assistance.  She understands that mesh would be used.  She understands that risks would include bleeding, infection, chronic pain, recurrence and injury to intra-abdominal structures and is willing to proceed.      Han Torres MD, FACS  General, Minimally Invasive and Endoscopic Surgery  [facility]    [address] [place] [address], [address]  [place], [place], [address]  P: [phone]  F: [phone]

## 2018-06-19 ENCOUNTER — OFFICE VISIT (OUTPATIENT)
Dept: SURGERY | Facility: CLINIC | Age: 63
End: 2018-06-19

## 2018-06-19 DIAGNOSIS — K40.90 RIGHT INGUINAL HERNIA: Primary | ICD-10-CM

## 2018-06-19 PROCEDURE — 99024 POSTOP FOLLOW-UP VISIT: CPT | Performed by: SURGERY

## 2018-06-19 NOTE — PROGRESS NOTES
Follow-up da Mike robot assisted right inguinal hernia repair    Subjective:  No complaints.  Minimal pain.  Incisions nicely healed.  Tomorrow will be 2 weeks and have instructor at that time she can get back to her normal activities.  No evidence for hernia recurrence and the patient appears be doing quite well.  Follow-up as needed.    Han Torres MD  General and Endoscopic Surgery  [facility]    [address], [address]  [place], [place], [address]  P: [phone]  F: [phone]

## 2018-07-16 ENCOUNTER — OFFICE VISIT (OUTPATIENT)
Dept: SURGERY | Facility: CLINIC | Age: 63
End: 2018-07-16

## 2018-07-16 DIAGNOSIS — G89.18 ACUTE POSTOPERATIVE PAIN OF RIGHT GROIN: Primary | ICD-10-CM

## 2018-07-16 DIAGNOSIS — R10.31 ACUTE POSTOPERATIVE PAIN OF RIGHT GROIN: Primary | ICD-10-CM

## 2018-07-16 PROCEDURE — 99024 POSTOP FOLLOW-UP VISIT: CPT | Performed by: SURGERY

## 2018-07-16 RX ORDER — HYDROCODONE BITARTRATE AND ACETAMINOPHEN 10; 325 MG/1; MG/1
1 TABLET ORAL 4 TIMES DAILY
Refills: 0 | COMMUNITY
Start: 2018-07-12 | End: 2019-09-26

## 2018-07-16 RX ORDER — GABAPENTIN 100 MG/1
100 CAPSULE ORAL 3 TIMES DAILY
Qty: 90 CAPSULE | Refills: 2 | Status: SHIPPED | OUTPATIENT
Start: 2018-07-16 | End: 2019-06-13

## 2018-07-16 RX ORDER — OMEPRAZOLE 40 MG/1
40 CAPSULE, DELAYED RELEASE ORAL
Qty: 30 CAPSULE | Refills: 2 | Status: SHIPPED | OUTPATIENT
Start: 2018-07-16 | End: 2018-09-28 | Stop reason: SDUPTHER

## 2018-08-01 RX ORDER — ESCITALOPRAM OXALATE 10 MG/1
10 TABLET ORAL DAILY
Qty: 90 TABLET | Refills: 3 | Status: SHIPPED | OUTPATIENT
Start: 2018-08-01 | End: 2019-06-13

## 2018-09-28 RX ORDER — OMEPRAZOLE 40 MG/1
40 CAPSULE, DELAYED RELEASE ORAL
Qty: 30 CAPSULE | Refills: 2 | Status: SHIPPED | OUTPATIENT
Start: 2018-09-28 | End: 2018-11-05

## 2018-11-05 RX ORDER — OMEPRAZOLE 40 MG/1
40 CAPSULE, DELAYED RELEASE ORAL
Qty: 30 CAPSULE | Refills: 2 | Status: SHIPPED | OUTPATIENT
Start: 2018-11-05 | End: 2019-06-13

## 2019-01-04 ENCOUNTER — TELEPHONE (OUTPATIENT)
Dept: FAMILY MEDICINE CLINIC | Facility: CLINIC | Age: 64
End: 2019-01-04

## 2019-01-14 ENCOUNTER — OFFICE VISIT (OUTPATIENT)
Dept: FAMILY MEDICINE CLINIC | Facility: CLINIC | Age: 64
End: 2019-01-14

## 2019-01-14 VITALS
WEIGHT: 172 LBS | BODY MASS INDEX: 27 KG/M2 | TEMPERATURE: 98.1 F | HEART RATE: 84 BPM | SYSTOLIC BLOOD PRESSURE: 120 MMHG | OXYGEN SATURATION: 97 % | DIASTOLIC BLOOD PRESSURE: 80 MMHG | HEIGHT: 67 IN

## 2019-01-14 DIAGNOSIS — N30.00 ACUTE CYSTITIS WITHOUT HEMATURIA: Primary | ICD-10-CM

## 2019-01-14 PROBLEM — S72.452K: Status: ACTIVE | Noted: 2017-06-07

## 2019-01-14 PROBLEM — Z96.659 FAILED TOTAL KNEE ARTHROPLASTY: Status: ACTIVE | Noted: 2017-05-24

## 2019-01-14 PROBLEM — N39.0 URINARY TRACT INFECTION WITHOUT HEMATURIA: Status: ACTIVE | Noted: 2019-01-14

## 2019-01-14 PROBLEM — M75.121 COMPLETE TEAR OF RIGHT ROTATOR CUFF: Status: ACTIVE | Noted: 2018-05-04

## 2019-01-14 PROBLEM — M47.816 LUMBAR SPONDYLOSIS: Status: ACTIVE | Noted: 2017-11-07

## 2019-01-14 PROBLEM — T84.018A FAILED TOTAL KNEE ARTHROPLASTY (HCC): Status: ACTIVE | Noted: 2017-05-24

## 2019-01-14 PROBLEM — I10 HYPERTENSION: Status: ACTIVE | Noted: 2019-01-14

## 2019-01-14 LAB
AMORPH URATE CRY URNS QL MICRO: ABNORMAL /HPF
BACTERIA UR QL AUTO: ABNORMAL /HPF
BILIRUB UR QL STRIP: NEGATIVE
CLARITY UR: CLEAR
COLOR UR: YELLOW
GLUCOSE UR STRIP-MCNC: NEGATIVE MG/DL
HGB UR QL STRIP.AUTO: NEGATIVE
KETONES UR QL STRIP: NEGATIVE
LEUKOCYTE ESTERASE UR QL STRIP.AUTO: ABNORMAL
NITRITE UR QL STRIP: NEGATIVE
PH UR STRIP.AUTO: 5.5 [PH] (ref 4.6–8)
PROT UR QL STRIP: NEGATIVE
RBC # UR: ABNORMAL /HPF
REF LAB TEST METHOD: ABNORMAL
SP GR UR STRIP: 1.01 (ref 1–1.03)
SQUAMOUS #/AREA URNS HPF: ABNORMAL /HPF
UROBILINOGEN UR QL STRIP: ABNORMAL
WBC UR QL AUTO: ABNORMAL /HPF

## 2019-01-14 PROCEDURE — 81001 URINALYSIS AUTO W/SCOPE: CPT | Performed by: NURSE PRACTITIONER

## 2019-01-14 PROCEDURE — 99213 OFFICE O/P EST LOW 20 MIN: CPT | Performed by: NURSE PRACTITIONER

## 2019-01-14 RX ORDER — NITROFURANTOIN 25; 75 MG/1; MG/1
100 CAPSULE ORAL 2 TIMES DAILY
Qty: 14 CAPSULE | Refills: 0 | Status: SHIPPED | OUTPATIENT
Start: 2019-01-14 | End: 2019-01-21

## 2019-01-14 NOTE — PROGRESS NOTES
Subjective   Megan Ratliff is a 63 y.o. female.     History of Present Illness   C/o strong urin odor > 2 wks after episode of diarrhea with some low back pain and has started cranberry juice and water, no dysuria and urin freq, trying to wipe front to back, not sexually active, no vag dc, odor, pain, itch, or bleeding, Had leftover medication cipro x 1 wk from 2015 mothers and unsure if helped, s/p hyst and last mammo 04/18, UTD colonoscopy     The following portions of the patient's history were reviewed and updated as appropriate: allergies, current medications, past family history, past medical history, past social history, past surgical history and problem list.    Review of Systems   Constitutional: Negative for fever.   Respiratory: Negative for cough, shortness of breath and wheezing.    Cardiovascular: Negative for chest pain, palpitations and leg swelling.   Gastrointestinal: Positive for diarrhea. Negative for abdominal distention, abdominal pain, anal bleeding, blood in stool, constipation, nausea, rectal pain and vomiting.   Genitourinary: Positive for flank pain. Negative for decreased urine volume, difficulty urinating, dyspareunia, dysuria (but odor), enuresis, frequency, genital sores, hematuria, menstrual problem, pelvic pain, urgency, vaginal bleeding, vaginal discharge and vaginal pain.   Neurological: Negative for dizziness and headaches.   All other systems reviewed and are negative.      Objective   Physical Exam   Constitutional: She is oriented to person, place, and time. She appears well-developed and well-nourished.   HENT:   Head: Normocephalic and atraumatic.   Eyes: Conjunctivae and EOM are normal. Pupils are equal, round, and reactive to light.   Cardiovascular: Normal rate, regular rhythm and normal heart sounds.   Pulmonary/Chest: Effort normal and breath sounds normal.   Abdominal: Soft. She exhibits no distension and no mass. There is no tenderness. There is CVA tenderness (left  sided). There is no rebound and no guarding. No hernia.   Musculoskeletal: Normal range of motion.   Neurological: She is alert and oriented to person, place, and time.   Skin: Skin is warm and dry.   Psychiatric: She has a normal mood and affect. Her behavior is normal. Judgment and thought content normal.   Vitals reviewed.      Assessment/Plan   Megan was seen today for urinary tract infection.    Diagnoses and all orders for this visit:    Acute cystitis without hematuria  -     Urinalysis With Microscopic - Urine, Clean Catch  -     Urinalysis without microscopic (no culture) - Urine, Clean Catch  -     Urinalysis, Microscopic Only - Urine, Clean Catch    Other orders  -     nitrofurantoin, macrocrystal-monohydrate, (MACROBID) 100 MG capsule; Take 1 capsule by mouth 2 (Two) Times a Day for 7 days. For UTI    UA today shows incomplete resolution, erx macrobid 100 mg BID x 1 wk, Wipe front to back, increase H20 8 glasses day, urinate after intercourse, enc freq toileting

## 2019-01-14 NOTE — PATIENT INSTRUCTIONS
UA today shows incomplete resolution, erx macrobid 100 mg BID x 1 wk, Wipe front to back, increase H20 8 glasses day, urinate after intercourse, enc freq toileting

## 2019-06-13 ENCOUNTER — OFFICE VISIT (OUTPATIENT)
Dept: FAMILY MEDICINE CLINIC | Facility: CLINIC | Age: 64
End: 2019-06-13

## 2019-06-13 VITALS
SYSTOLIC BLOOD PRESSURE: 110 MMHG | WEIGHT: 159 LBS | TEMPERATURE: 98.9 F | BODY MASS INDEX: 24.96 KG/M2 | DIASTOLIC BLOOD PRESSURE: 68 MMHG | HEART RATE: 89 BPM | HEIGHT: 67 IN | OXYGEN SATURATION: 97 %

## 2019-06-13 DIAGNOSIS — N63.15 BREAST LUMP ON RIGHT SIDE AT 12 O'CLOCK POSITION: Primary | ICD-10-CM

## 2019-06-13 DIAGNOSIS — Z85.3 PERSONAL HISTORY OF BREAST CANCER: ICD-10-CM

## 2019-06-13 DIAGNOSIS — R10.31 ABDOMINAL DISCOMFORT IN RIGHT LOWER QUADRANT: ICD-10-CM

## 2019-06-13 PROCEDURE — 99214 OFFICE O/P EST MOD 30 MIN: CPT | Performed by: INTERNAL MEDICINE

## 2019-06-13 NOTE — PROGRESS NOTES
Subjective   Megan Ratliff is a 63 y.o. female.   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  Dr. Martinez we will have patient see him again as well.  Also weight loss we will get some fasting lab work on patient return.  The breast cancer was in 2005.  Patient did have a EGD and colonoscopy done in 2018  Family history of colon cancer breast cancer.  Patient's former smoker quit in 2011 Milly include codeine causing itching rash Percocet nausea vomiting and Ambien undefined  Review of Systems   Constitutional: Positive for unexpected weight change.   Gastrointestinal: Positive for abdominal pain.   All other systems reviewed and are negative.      Objective   Vitals:    06/13/19 1310   BP: 110/68   Pulse: 89   Temp: 98.9 °F (37.2 °C)   SpO2: 97%   Weight: 72.1 kg (159 lb)     Physical Exam   Constitutional: She appears well-developed and well-nourished.   HENT:   Head: Normocephalic and atraumatic.   Eyes: Conjunctivae are normal. Pupils are equal, round, and reactive to light.   Cardiovascular: Normal rate, regular rhythm and normal heart sounds.   Pulmonary/Chest: Effort normal and breath sounds normal.   Abdominal: Soft. Bowel sounds are normal.   Mild discomfort right lower quadrant in the vicinity of her right repair as very low right abdomen discomfort no guarding noted with this I am unable to palpate a distinct bulge we did push on exam in both the supine etc. and then stood up without discrete bulges noted unless this is  the area of prior hernia repair will have patient see Dr. Resendiz her general surgeon.   Musculoskeletal:   Breast exam right breast only patient is old incision site upper breast approximately 10-12 o'clock.  Slightly below that at about 12:00 I do find a firm 2 x 1.5 cm subcu mass somewhat deep within the breast.  No other discrete lesions noted palpating right breast right axilla is also benign.  Given history of breast cancer this breast I am concerned we will send her for mammogram and ultrasound of the right breast.  She is done this will [facility] with us pending test proceed further.   Neurological: She is alert.   Remarkable gait and station   Skin: Skin is warm and dry.   Nursing note and vitals reviewed.      No results found for: INR    Procedures    Assessment/Plan 1.  Right breast pain/lump right breast plan updated mammogram right breast ultrasound probable surgical evaluation at that point time.  Patient is aware that she needs to be concerned given her personal history breast cancer in the right breast.    2.  Right lower quadrant pain/inguinal pain patient see Dr. Mandujano her general surgeon she will self refer if there is problems that she is let us know will initiate referral.    3.  Weight loss undefined patient is return for lab work urine these return fasting we will get CBC TSH CMP cortisol also get chest x-ray at that point in time patient also do a weekly weight diary to monitor    Much of this encounter note is an electronic transcription/translation of spoken language to printed text.  The electronic translation of spoken language may permit erroneous, or at times, nonsensical words or phrases to be inadvertently transcribed.  Although I have reviewed the note for such errors, some may still exist. If there are questions or for further clarification, please contact me.

## 2019-06-24 ENCOUNTER — OFFICE VISIT (OUTPATIENT)
Dept: SURGERY | Facility: CLINIC | Age: 64
End: 2019-06-24

## 2019-06-24 VITALS — HEART RATE: 83 BPM | HEIGHT: 67 IN | BODY MASS INDEX: 25.3 KG/M2 | OXYGEN SATURATION: 95 % | WEIGHT: 161.2 LBS

## 2019-06-24 DIAGNOSIS — R10.31 RLQ ABDOMINAL PAIN: Primary | ICD-10-CM

## 2019-06-24 DIAGNOSIS — M05.79 RHEUMATOID ARTHRITIS INVOLVING MULTIPLE SITES WITH POSITIVE RHEUMATOID FACTOR (HCC): ICD-10-CM

## 2019-06-24 PROCEDURE — 99213 OFFICE O/P EST LOW 20 MIN: CPT | Performed by: SURGERY

## 2019-06-24 NOTE — PROGRESS NOTES
Chief complaint: Right lower quadrant abdominal pain    History presenting illness:  63-year-old lady with multiple pain complaints who underwent a inguinal hernia repair done in July of last year.  Initially she seemed to be doing pretty well, but then did develop some pain in the right lower quadrant and right hip.  She says that this pain has been on and off.  She describes it as a burning type of intermittent pain.  It seems to be worse when she is sitting down and flexed.  Some days, such as today it seems to improve.  She does say that the narcotic pain medicine she takes seems to improve it.    Review of systems:  Constitutional: Positive for weight loss over the last 6 months, negative for fever or chills  Respiratory: Negative for cough or hemoptysis  Gastrointestinal: Positive for diarrhea, negative for rectal bleeding    Physical exam:  Body mass index 25.3  General: Awake alert and oriented, no acute distress  Eyes: Extraocular limits are intact, no scleral icterus  Respiratory: Lungs are clear bilaterally, no wheeze or use of accessory muscles  Gastrointestinal: Abdomen is soft, no masses noted.  No hernia is felt in the ventral region or in the inguinal region.  There is some mild tenderness in the right lower quadrant.  The patient points over to her right hip where the pain seems to radiate.    Assessment and plan:  -Right-sided lower abdominal pain, etiology unclear  -No clinical evidence of hernia recurrence  -Chronic pain including chronic low back pain  - all things considered, my suspicion is that there is really nothing which may be improved surgically, but I do think it is reasonable to get a CT of her abdomen and pelvis to make sure there is nothing else that we are missing.  I do not think that hernia recurrence is likely.  It may be reasonable to consider having her see orthopedics in case the problem is related directly to her hip.    We will contact her once her CT is completed.    Han  MD Melissa  General and Endoscopic Surgery  [facility]    [address], [address]  [place], [place], [address]  P: [phone]  F: [phone]

## 2019-07-08 ENCOUNTER — TELEPHONE (OUTPATIENT)
Dept: SURGERY | Facility: CLINIC | Age: 64
End: 2019-07-08

## 2019-07-11 RX ORDER — ESCITALOPRAM OXALATE 10 MG/1
TABLET ORAL
Qty: 90 TABLET | Refills: 3 | Status: SHIPPED | OUTPATIENT
Start: 2019-07-11 | End: 2019-07-15 | Stop reason: DRUGHIGH

## 2019-07-15 ENCOUNTER — TELEPHONE (OUTPATIENT)
Dept: FAMILY MEDICINE CLINIC | Facility: CLINIC | Age: 64
End: 2019-07-15

## 2019-07-15 RX ORDER — ESCITALOPRAM OXALATE 10 MG/1
15 TABLET ORAL DAILY
Qty: 135 TABLET | Refills: 3 | Status: SHIPPED | OUTPATIENT
Start: 2019-07-15 | End: 2019-08-06 | Stop reason: DRUGHIGH

## 2019-07-15 NOTE — TELEPHONE ENCOUNTER
PT'S RX FOR LEXAPRO WAS SENT IN AGAIN, BUT SHE SAYS THE DOSAGE AND MG. ARE STILL NOT CORRECT.  PT SAID SHE IS SUPPOSE TO TAKE 15 MG. DAILY, WHICH WOULD BE 1.5 TABLETS DAILY OF THE PRESCRIBED 10 MG TABLETS. BUT SHE ONLY RECEIVED ENOUGH TO TAKE 1 TABLET DAILY, AND IF SHE TAKES 15, SHE WILL RUN OUT BEFORE THE 90'S DAYS LIKE BEFORE

## 2019-07-16 NOTE — TELEPHONE ENCOUNTER
CT showed mild inflammation of the ascending colon etiology not clear at least to me.  Also evidence of fatty liver patient has seen surgeon check with her to see what his findings are plan is on this.

## 2019-07-19 ENCOUNTER — OFFICE VISIT (OUTPATIENT)
Dept: FAMILY MEDICINE CLINIC | Facility: CLINIC | Age: 64
End: 2019-07-19

## 2019-07-19 VITALS
HEIGHT: 67 IN | DIASTOLIC BLOOD PRESSURE: 70 MMHG | WEIGHT: 154.6 LBS | HEART RATE: 61 BPM | OXYGEN SATURATION: 98 % | SYSTOLIC BLOOD PRESSURE: 110 MMHG | TEMPERATURE: 99 F | BODY MASS INDEX: 24.27 KG/M2

## 2019-07-19 DIAGNOSIS — K43.9 VENTRAL HERNIA WITHOUT OBSTRUCTION OR GANGRENE: ICD-10-CM

## 2019-07-19 DIAGNOSIS — N63.10 LUMP OF BREAST, RIGHT: ICD-10-CM

## 2019-07-19 DIAGNOSIS — R19.7 DIARRHEA, UNSPECIFIED TYPE: Primary | ICD-10-CM

## 2019-07-19 DIAGNOSIS — R93.5 ABNORMAL CT OF THE ABDOMEN: ICD-10-CM

## 2019-07-19 DIAGNOSIS — R63.4 WEIGHT LOSS: ICD-10-CM

## 2019-07-19 LAB
ALBUMIN SERPL-MCNC: 3.8 G/DL (ref 3.5–5.2)
ALBUMIN/GLOB SERPL: 1.5 G/DL
ALP SERPL-CCNC: 77 U/L (ref 39–117)
ALT SERPL W P-5'-P-CCNC: 12 U/L (ref 1–33)
ANION GAP SERPL CALCULATED.3IONS-SCNC: 10.6 MMOL/L (ref 5–15)
AST SERPL-CCNC: 16 U/L (ref 1–32)
BILIRUB SERPL-MCNC: 0.3 MG/DL (ref 0.2–1.2)
BUN BLD-MCNC: 13 MG/DL (ref 8–23)
BUN/CREAT SERPL: 23.6 (ref 7–25)
CALCIUM SPEC-SCNC: 9.8 MG/DL (ref 8.6–10.5)
CHLORIDE SERPL-SCNC: 103 MMOL/L (ref 98–107)
CO2 SERPL-SCNC: 26.4 MMOL/L (ref 22–29)
CREAT BLD-MCNC: 0.55 MG/DL (ref 0.57–1)
ERYTHROCYTE [DISTWIDTH] IN BLOOD BY AUTOMATED COUNT: 13.4 % (ref 12.3–15.4)
GFR SERPL CREATININE-BSD FRML MDRD: 112 ML/MIN/1.73
GLOBULIN UR ELPH-MCNC: 2.6 GM/DL
GLUCOSE BLD-MCNC: 94 MG/DL (ref 65–99)
HCT VFR BLD AUTO: 35.8 % (ref 34–46.6)
HGB BLD-MCNC: 12.1 G/DL (ref 12–15.9)
LYMPHOCYTES # BLD AUTO: 2.4 10*3/MM3 (ref 0.7–3.1)
LYMPHOCYTES NFR BLD AUTO: 51.5 % (ref 19.6–45.3)
MCH RBC QN AUTO: 30.4 PG (ref 26.6–33)
MCHC RBC AUTO-ENTMCNC: 33.9 G/DL (ref 31.5–35.7)
MCV RBC AUTO: 89.6 FL (ref 79–97)
MONOCYTES # BLD AUTO: 0.4 10*3/MM3 (ref 0.1–0.9)
MONOCYTES NFR BLD AUTO: 9.6 % (ref 5–12)
NEUTROPHILS # BLD AUTO: 1.8 10*3/MM3 (ref 1.7–7)
NEUTROPHILS NFR BLD AUTO: 38.9 % (ref 42.7–76)
PLATELET # BLD AUTO: 260 10*3/MM3 (ref 140–450)
PMV BLD AUTO: 6.4 FL (ref 6–12)
POTASSIUM BLD-SCNC: 4.5 MMOL/L (ref 3.5–5.2)
PROT SERPL-MCNC: 6.4 G/DL (ref 6–8.5)
RBC # BLD AUTO: 3.99 10*6/MM3 (ref 3.77–5.28)
SODIUM BLD-SCNC: 140 MMOL/L (ref 136–145)
TSH SERPL DL<=0.05 MIU/L-ACNC: <0.005 MIU/ML (ref 0.27–4.2)
WBC NRBC COR # BLD: 4.6 10*3/MM3 (ref 3.4–10.8)

## 2019-07-19 PROCEDURE — 84443 ASSAY THYROID STIM HORMONE: CPT | Performed by: INTERNAL MEDICINE

## 2019-07-19 PROCEDURE — 99214 OFFICE O/P EST MOD 30 MIN: CPT | Performed by: INTERNAL MEDICINE

## 2019-07-19 PROCEDURE — 36415 COLL VENOUS BLD VENIPUNCTURE: CPT | Performed by: INTERNAL MEDICINE

## 2019-07-19 PROCEDURE — 80053 COMPREHEN METABOLIC PANEL: CPT | Performed by: INTERNAL MEDICINE

## 2019-07-19 PROCEDURE — 85025 COMPLETE CBC W/AUTO DIFF WBC: CPT | Performed by: INTERNAL MEDICINE

## 2019-07-19 NOTE — PROGRESS NOTES
Subjective   Megan Ratliff is a 63 y.o. female.   Several issues going on including diarrhea finding of abnormal CT of the abdomen with what appears to be colitis infectious versus other in the ascending and right colon.  CAT scan done at [facility] no blood or mucus noted in stool does have 2 family members I believe cousins with inflammatory bowel disease  History of Present Illness   In addition to better with the diarrhea and the abnormal CAT scan of the abdomen with abdominal discomfort is having increased discomfort from a ventral hernia seen would like a second opinion from another surgeon on that also personal history for breast cancer as well as strong family history breast lump right breast imaging with both ultrasound and mammogram we will have a breast surgeon see this to see if anything further needs to be done regarding the weight loss we do not have a good explanation she is been eating slightly less over the last few months but nothing untoward now course has potential: Issues and diarrhea also p potentially contributory.  We will get some screening labs deftly get GI to see her regarding general surgeon wish to use a [facility] surgeon we will send patient Dr. Quinn.  No increased cough so no distinct etiology to her weight loss we will see with lab shows but basically have a breast lump and a personal past history of breast cancer diarrhea and abnormal imaging of the ascending and right colon.  Allergies are codeine causing rash and itching, Percocet caused nausea vomiting, Ambien.  Patient is a former smoker  Review of Systems   All other systems reviewed and are negative.      Objective   Vitals:    07/19/19 1357   BP: 110/70   Pulse: 61   Temp: 99 °F (37.2 °C)   SpO2: 98%   Weight: 70.1 kg (154 lb 9.6 oz)     Physical Exam   Constitutional: She appears well-developed and well-nourished.   HENT:   Head: Normocephalic and atraumatic.   Eyes: Conjunctivae are normal. Pupils are equal,  round, and reactive to light. No scleral icterus.   Cardiovascular: Normal rate, regular rhythm and normal heart sounds.   Pulmonary/Chest: Effort normal and breath sounds normal.   Abdominal: Soft. Bowel sounds are normal.   No real tenderness minimal soreness in the right lower abdomen.  She does have surprising amount of discomfort midline epigastrium and slightly lower in the area of her ventral hernia.  Having patient go from supine to sitting did not feel prominent bulge there but would seem to have a hernia there by imaging purposes.  We will get a second surgical opinion on this   Neurological: She is alert.   Stable gait and station   Skin: Skin is warm and dry.   Nursing note and vitals reviewed.      No results found for: INR    Procedures    Assessment/Plan   .  Diarrhea stool specimens will be obtained patient see Dr. Ureña    2.  Abnormal CT of the abdomen specifically referencing the right colon see GI for this as well.    3.  Weight loss weight pending lab potentially multiple possibilities    4.  Right breast lump referral to Dr. Newton for further evaluation    5.  Ventral hernia refer to Dr. Quinn for further evaluation  Weight pending lab further follow-up contingent on pending tests as well as specialist visits.    Much of this encounter note is an electronic transcription/translation of spoken language to printed text.  The electronic translation of spoken language may permit erroneous, or at times, nonsensical words or phrases to be inadvertently transcribed.  Although I have reviewed the note for such errors, some may still exist. If there are questions or for further clarification, please contact me.

## 2019-07-22 DIAGNOSIS — R79.89 ABNORMAL TSH: Primary | ICD-10-CM

## 2019-07-22 DIAGNOSIS — R94.6 ABNORMAL RESULTS OF THYROID FUNCTION STUDIES: ICD-10-CM

## 2019-07-25 ENCOUNTER — OFFICE VISIT (OUTPATIENT)
Dept: MAMMOGRAPHY | Facility: CLINIC | Age: 64
End: 2019-07-25

## 2019-07-25 VITALS
SYSTOLIC BLOOD PRESSURE: 100 MMHG | WEIGHT: 156 LBS | BODY MASS INDEX: 24.48 KG/M2 | HEIGHT: 67 IN | DIASTOLIC BLOOD PRESSURE: 60 MMHG

## 2019-07-25 DIAGNOSIS — Z85.3 PERSONAL HISTORY OF BREAST CANCER: ICD-10-CM

## 2019-07-25 DIAGNOSIS — N63.10 MASS OF BREAST, RIGHT: Primary | ICD-10-CM

## 2019-07-25 PROCEDURE — 99214 OFFICE O/P EST MOD 30 MIN: CPT | Performed by: SURGERY

## 2019-07-25 RX ORDER — PHENOL 1.4 %
600 AEROSOL, SPRAY (ML) MUCOUS MEMBRANE DAILY
COMMUNITY
End: 2019-08-09

## 2019-07-25 NOTE — PROGRESS NOTES
Chief Complaint: Megan Ratliff is a 63 y.o.. female here today for Breast Mass (Right)        History of Present Illness:  Patient presents with breast mass. Right breast.  She is a nice 63-year-old white female who developed right breast cancer that was treated by breast conserving surgery in 2005.  She did receive adjuvant chemotherapy and radiation.  The patient has always had some dimpling to the skin where her drain was placed at the time of surgery.  She has lost about 40 pounds recently and has noted some changes at the lumpectomy site that have concerned her.  She feels a couple of marble sized areas as well as some increased tenderness.  The patient underwent mammogram imaging and there was scarring and architectural distortion at the lumpectomy site but nothing that was particularly different.  She also had an ultrasound of the right breast and aside from the scarring there was nothing of major concern.    The patient has a significant family history which includes a maternal grandmother and mother who both had breast cancer.  She also has a cousin with pancreatic cancer and another cousin with melanoma and an uncle with brain cancer.  She has not had any genetic testing.  I have personally reviewed the reports and the images that she brought.  She does have significant scarring at the lumpectomy site but nothing that looks progressive or concerning.  Obviously it is difficult to see through scar.    Review of Systems:  Review of Systems   Skin:        The patient has occasional itching on breast.    All other systems reviewed and are negative.       Past Medical and Surgical History:  Breast Biopsy History:  Patient has had the following breast biopsies:2005 left-benign 2005 right-malignant 2009 left-benign  Breast Cancer HIstory:  Patient has the following past medical history of breast cancer treatment:Right breast cancer treated with lumpectomy, chemo, and radiation  Breast Operations, and year:  2005  right lumpectomy  Social History     Tobacco Use   Smoking Status Former Smoker   • Packs/day: 0.00   • Years: 30.00   • Pack years: 0.00   • Types: Cigarettes   • Start date: 1984   • Last attempt to quit: 2014   • Years since quittin.5   Smokeless Tobacco Never Used   Tobacco Comment    Never was a heavy smoker. Pack would last a week or so.Began when father passed away 2000     Obstetric History:  Patient is postmenopausal due to removal of her uterus in the following year:   Number of pregnancies:1  Number of live births: 2  Number of abortions or miscarriages: 0  Age of delivery of first child: 30  Patient did not breast feed.  Length of time taking birth control pills:does not remember how long  Patient has never taken hormone replacement    Past Surgical History:   Procedure Laterality Date   • APPENDECTOMY N/A    • BREAST BIOPSY Right    • BREAST LUMPECTOMY Right     Right breast lumpectomy, lymph node dissection   • CARPAL TUNNEL RELEASE Right 2005    Release right carpal tunnel, excision ganglion cyst of left wrist, excision cyst of the right brow, excision cyst of the right index finger-Dr. Guzman Pandey   • CARPAL TUNNEL RELEASE Left 2017    Thumb arthrodesis carpometacarpal joint with local distal radius bone graft, neuroplasty median nerve at the carpal tunnel regional left-Dr. Tanner Stiles   • CHOLECYSTECTOMY N/A    • COLONOSCOPY N/A 3/20/2018    Procedure: COLONOSCOPY TO CECUM AND INTO TERMINAL ILEUM;  Surgeon: Claudy Ureña MD;  Location: [facility] ENDOSCOPY;  Service: Gastroenterology   • ENDOSCOPY N/A 3/20/2018    Procedure: ESOPHAGOGASTRODUODENOSCOPY WITH COLD BIOPSIES;  Surgeon: Claudy Ureña MD;  Location: [facility] ENDOSCOPY;  Service: Gastroenterology   • ENDOSCOPY AND COLONOSCOPY N/A 2006    Gastritis: biopsied, esophagitis: biopsied, and hemorrhoids-Dr. Claudy Ureña   • HYSTERECTOMY N/A    • INGUINAL HERNIA REPAIR Right 2018     Procedure: right INGUINAL HERNIA REPAIR LAPAROSCOPIC WITH DAVINCI ROBOT;  Surgeon: Han Torres MD;  Location: [facility];  Service: DaVinci   • ORIF FEMUR FRACTURE Left 02/24/2017    ORIF supra condylar femur fracture-Dr. 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 Ollie Stevenson   • SHOULDER ARTHROSCOPY W/ ROTATOR CUFF REPAIR Right 05/08/2018    Right shoulder arthroscopy, arthroscopic right rotator cuff revision repair, revision acromioplasty, PRP injection-Dr. Luis Kendrick   • TONSILLECTOMY Bilateral     as a child   • TOTAL KNEE ARTHROPLASTY Left 06/22/2015    Dr. Ger Lester   • TOTAL KNEE ARTHROPLASTY Right 12/22/2014    Dr. Ger Lester   • WISDOM TOOTH EXTRACTION Bilateral        Past Medical History:   Diagnosis Date   • Anxiety    • Arthritis     rheumatiod   • Breast cancer (CMS/HCC) 2005    Right breast   • Cholelithiasis 1994 approximately    Dr. Claudy Ureña   • Hypertension     NO MEDS NOW   • Hypothyroidism    • Inguinal hernia     RIGHT   • Lumbar spondylosis    • Osteopenia    • Pancreatitis 1994 approx Dr. Claudy Ureña    Adverse reaction after a dye test   • PONV (postoperative nausea and vomiting)        Prior Hospitalizations, other than for surgery or childbirth, and year:  None    Social History:  Patient is single.  Patient has two daughters.    Family History:  Family History   Problem Relation Age of Onset   • Colon cancer Mother    • Breast cancer Mother    • Arthritis Mother    • Cancer Mother    • Heart disease Mother    • Hypertension Mother    • Arthritis Father    •  Heart disease Father    • Hypertension Father    • Cancer Maternal Grandmother    • Breast cancer Maternal Grandmother    • Cancer Maternal Aunt    • Early death Brother    • Heart disease Maternal Grandfather    • Hypertension Maternal Grandfather    • Hypertension Brother    • Malig Hyperthermia Neg Hx        Vital Signs:  Vitals:    07/25/19 1151   BP: 100/60       Medications:    Current Outpatient Prescriptions:     Current Outpatient Medications:   •  calcium carbonate (OS-ROSEMARY) 600 MG tablet, Take 600 mg by mouth Daily., Disp: , Rfl:   •  aspirin 81 MG chewable tablet, Chew 81 mg Daily., Disp: , Rfl:   •  escitalopram (LEXAPRO) 10 MG tablet, Take 1.5 tablets by mouth Daily., Disp: 135 tablet, Rfl: 3  •  HYDROcodone-acetaminophen (NORCO)  MG per tablet, Take 1 tablet by mouth 4 (Four) Times a Day., Disp: , Rfl: 0    Physical Examination:  General Appearance:   Patient is in no distress.  She is well kept and has an average build.   Psychiatric:  Patient with appropriate mood and affect. Alert and oriented to self, time, and place.    Breast, RIGHT:  medium sized, symmetric with the contralateral side.  Breast skin is without erythema, edema, rashes.  There is some dimpling in the anterior axillary line related to what looks like an old drain site.. There is no nipple retraction, discharge or nipple/areolar skin changes.There are no masses palpable in the sitting or supine positions.  She does have a scar in the upper outer quadrant where the lumpectomy was performed.  There is a defect transversely across this area that corresponds with the scar.  I feel some nodular breast tissue along the edges but no discrete or concerning masses.    Breast, LEFT:  medium sized, symmetric with the contralateral side.  Breast skin is without erythema, edema, rashes.  There are no visible abnormalities upon inspection during the arm-raising maneuver or with hands on hips in the sitting position. There is no nipple  retraction, discharge or nipple/areolar skin changes.There are no masses palpable in the sitting or supine positions.    Lymphatic:  There is no  cervical, infraclavicular, or supraclavicular adenopathy bilaterally.  She does have some mobile right axillary lymph nodes.  Eyes:  Pupils are round and reactive to light.  Cardiovascular:  Heart rate and rhythm are regular.  Respiratory:  Lungs are clear bilaterally with no crackles or wheezes in any lung field.  Gastrointestinal:  Abdomen is soft, nondistended, and nontender.  There was no evidence of hepatosplenomegaly or abdominal mass.  There are  scars from previous laparoscopic gallbladder surgery.    Musculoskeletal:  Good strength in all 4 extremities.   There is good range of motion in both shoulders.    Skin:  No new skin lesions or rashes on the skin excluding the breast (see breast exam above).    Assessment:  1. Mass of breast, right    2. Personal history of breast cancer          Plan:  At this point in time, I do not feel any concerning masses in the breast.  These areas are always difficult to examine because of the postoperative changes that have occurred.  I have not examined her before but do not feel anything that concerns me right now.  I think that her weight loss may have made it easier to feel areas in the breast that may be she could not of felt before.  She does have a very strong family history for breast cancer and I think needs to undergo genetic testing.  I also think an MRI would be very helpful and in fact because of her increased risk, she probably needs to have this done yearly.  Orders have been placed for both the genetic testing and the MRI.      CPT coding:    Next Appointment:  No Follow-up on file.            EMR Dragon/transcription disclaimer:    Much of this encounter note is an electronic transcription/translocation of spoken language to printed text.  The electronic translation of spoken language may permit erroneous, or at  times, nonsensical words or phrases to be inadvertently transcribed.  Although I have reviewed the note from such areas, some may still exist.

## 2019-07-26 ENCOUNTER — TELEPHONE (OUTPATIENT)
Dept: OTHER | Facility: HOSPITAL | Age: 64
End: 2019-07-26

## 2019-07-29 ENCOUNTER — TELEPHONE (OUTPATIENT)
Dept: MAMMOGRAPHY | Facility: CLINIC | Age: 64
End: 2019-07-29

## 2019-07-29 NOTE — TELEPHONE ENCOUNTER
Called patient to give date of MRI had to leave a voicemail requesting she call for date/ time details.     Tuesday, July 30 th arriving at 11:15 am at the main campus- [address] [place] [address] Radiology department

## 2019-07-30 ENCOUNTER — APPOINTMENT (OUTPATIENT)
Dept: MRI IMAGING | Facility: HOSPITAL | Age: 64
End: 2019-07-30

## 2019-08-06 ENCOUNTER — TELEPHONE (OUTPATIENT)
Dept: FAMILY MEDICINE CLINIC | Facility: CLINIC | Age: 64
End: 2019-08-06

## 2019-08-06 RX ORDER — ESCITALOPRAM OXALATE 20 MG/1
20 TABLET ORAL DAILY
Qty: 30 TABLET | Refills: 0 | Status: SHIPPED | OUTPATIENT
Start: 2019-08-06 | End: 2019-08-28 | Stop reason: SDUPTHER

## 2019-08-06 NOTE — TELEPHONE ENCOUNTER
See if we can give patient Lexapro 10 mg 2 tablets daily with her least trying 1.5 tablets a day if not successful over 2 weeks time going up to 2/day

## 2019-08-09 ENCOUNTER — OFFICE VISIT (OUTPATIENT)
Dept: FAMILY MEDICINE CLINIC | Facility: CLINIC | Age: 64
End: 2019-08-09

## 2019-08-09 VITALS
HEIGHT: 67 IN | BODY MASS INDEX: 23.35 KG/M2 | OXYGEN SATURATION: 95 % | SYSTOLIC BLOOD PRESSURE: 110 MMHG | HEART RATE: 95 BPM | TEMPERATURE: 98.7 F | WEIGHT: 148.8 LBS | DIASTOLIC BLOOD PRESSURE: 78 MMHG

## 2019-08-09 DIAGNOSIS — J01.00 ACUTE MAXILLARY SINUSITIS, RECURRENCE NOT SPECIFIED: Primary | ICD-10-CM

## 2019-08-09 DIAGNOSIS — R93.5 ABNORMAL CT OF THE ABDOMEN: ICD-10-CM

## 2019-08-09 DIAGNOSIS — K43.9 VENTRAL HERNIA WITHOUT OBSTRUCTION OR GANGRENE: ICD-10-CM

## 2019-08-09 DIAGNOSIS — R79.89 ABNORMAL TSH: ICD-10-CM

## 2019-08-09 DIAGNOSIS — E07.89 OTHER SPECIFIED DISORDERS OF THYROID: ICD-10-CM

## 2019-08-09 DIAGNOSIS — H61.21 IMPACTED CERUMEN OF RIGHT EAR: ICD-10-CM

## 2019-08-09 LAB
T3FREE SERPL-MCNC: 11.6 PG/ML (ref 2–4.4)
T4 FREE SERPL-MCNC: 3.18 NG/DL (ref 0.93–1.7)

## 2019-08-09 PROCEDURE — 99214 OFFICE O/P EST MOD 30 MIN: CPT | Performed by: INTERNAL MEDICINE

## 2019-08-09 PROCEDURE — 69209 REMOVE IMPACTED EAR WAX UNI: CPT | Performed by: INTERNAL MEDICINE

## 2019-08-09 PROCEDURE — 84439 ASSAY OF FREE THYROXINE: CPT | Performed by: INTERNAL MEDICINE

## 2019-08-09 PROCEDURE — 84481 FREE ASSAY (FT-3): CPT | Performed by: INTERNAL MEDICINE

## 2019-08-09 PROCEDURE — 36415 COLL VENOUS BLD VENIPUNCTURE: CPT | Performed by: INTERNAL MEDICINE

## 2019-08-09 RX ORDER — AMOXICILLIN 875 MG/1
875 TABLET, COATED ORAL 2 TIMES DAILY
Qty: 20 TABLET | Refills: 0 | Status: SHIPPED | OUTPATIENT
Start: 2019-08-09 | End: 2019-08-19

## 2019-08-09 RX ORDER — CYCLOBENZAPRINE HCL 10 MG
TABLET ORAL
Refills: 1 | COMMUNITY
Start: 2019-08-02 | End: 2019-08-23 | Stop reason: ALTCHOICE

## 2019-08-09 NOTE — PROGRESS NOTES
Subjective   Megan Ratliff is a 63 y.o. female.   Sinus pressure mild drainage as well.  Also fullness in ears more so the right ear history of serum impaction concerned about same  History of Present Illness   As above sinus pressure with mild drainage or sore over the maxillary sinuses no reported temperature this fullness in the ears with history of recurrent cerumen impaction concerned about same right ear is indeed impacted today left ear is not no reported temperature patient has had previous evaluation showing ventral hernia back CAT scan imaging the abdominal wall was not felt to have seen by surgeon still has sores in general area he would like to get evaluation so sent her to Dr. Dong versus other patient also had findings of colitis by recent CT clinically she is well that time to come with vomiting diarrhea illness which subsided very quickly after her visit here.  The stool cultures remain unremarkable we will send her to GI Dr. Brantley for further evaluation has recently seen Dr. Newton with further imaging will be done on her breast but do not find any thing clearly biopsy regarding the lump on the breast with personal history for breast cancer and patient has an abnormally low TSH we need to get free T4 free T4 on patient to see if this is real or not overall is doing fairly well.  Family history positive for colon cancer breast cancer arthritis undefined cancer heart disease hypertension.  Patient is a former smoker quit in 2014 drug eyes are codeine causing itching rash to Percocet causing nausea vomiting.  Review of Systems   HENT: Positive for congestion, postnasal drip and sinus pressure. Negative for sinus pain.    All other systems reviewed and are negative.      Objective   Vitals:    08/09/19 1358   BP: 110/78   Pulse: 95   Temp: 98.7 °F (37.1 °C)   SpO2: 95%   Weight: 67.5 kg (148 lb 12.8 oz)     Physical Exam   Constitutional: She appears well-developed and well-nourished.   HENT:    Head: Normocephalic and atraumatic.   Left Ear: External ear normal.   Mouth/Throat: Oropharynx is clear and moist.   Left TM with minimal wax in it not clinically significant right ear canal basically blocked by cerumen no TM visualized had irrigate ear with H2O before visualization TM which is now appears normal without irritation of the ear canal as well.   Eyes: Conjunctivae are normal. Pupils are equal, round, and reactive to light.   Cardiovascular: Normal rate, regular rhythm and normal heart sounds.   Pulmonary/Chest: Effort normal and breath sounds normal.   Abdominal: Soft. Bowel sounds are normal.   No significant tenderness with palpation but does have some discomfort in the abdominal wall.  We will get opinion from surgeon again regarding possible small ventral hernia showing up on CAT scan imaging to see if it needs clinical addressing with surgery etc.   Neurological: She is alert.   Unremarkable gait and station   Skin: Skin is warm and dry.   Nursing note and vitals reviewed.      No results found for: INR    Ear Cerumen Removal  Date/Time: 8/9/2019 5:21 PM  Performed by: Carlos Young Jr., MD  Authorized by: Carlos Young Jr., MD   Consent: Verbal consent obtained.  Consent given by: patient  Patient understanding: patient states understanding of the procedure being performed  Patient identity confirmed: verbally with patient    Anesthesia:  Local Anesthetic: none  Location details: right ear  Patient tolerance: Patient tolerated the procedure well with no immediate complications  Comments: Right ear irrigated with H2O with resolution of cerumen impaction TM post procedure is normal.  No irritation of canal.  No dizziness or other problems with procedure with total resolution of cerumen impaction plan will patient follow-up PRN for this occurs again.  Procedure type: irrigation   Sedation:  Patient sedated: no            Assessment/Plan     1.  Acute maxillary sinusitis plan amoxicillin  875 twice daily for 10 days time    2.  Right cerumen impaction resolved with irrigation follow-up on as-needed basis    3.  Abnormal CT of the abdomen showing possible colitis refer to Alison Brantley    4.  Ventral hernia abdominal wall updated surgical consult regarding same and possible need for surgery versus not    5.  Abnormal TSH get free T3 and free T4 if these are good we will get repeat TSH in 3 months time    Much of this encounter note is an electronic transcription/translation of spoken language to printed text.  The electronic translation of spoken language may permit erroneous, or at times, nonsensical words or phrases to be inadvertently transcribed.  Although I have reviewed the note for such errors, some may still exist. If there are questions or for further clarification, please contact me.

## 2019-08-12 DIAGNOSIS — R79.89 ABNORMAL THYROID BLOOD TEST: Primary | ICD-10-CM

## 2019-08-21 ENCOUNTER — HOSPITAL ENCOUNTER (OUTPATIENT)
Dept: MRI IMAGING | Facility: HOSPITAL | Age: 64
Discharge: HOME | End: 2019-08-21
Admitting: SURGERY

## 2019-08-21 DIAGNOSIS — Z85.3 PERSONAL HISTORY OF BREAST CANCER: ICD-10-CM

## 2019-08-21 DIAGNOSIS — N63.10 MASS OF BREAST, RIGHT: ICD-10-CM

## 2019-08-21 PROCEDURE — A9577 INJ MULTIHANCE: HCPCS | Performed by: SURGERY

## 2019-08-21 PROCEDURE — 82565 ASSAY OF CREATININE: CPT

## 2019-08-21 PROCEDURE — 0 GADOBENATE DIMEGLUMINE 529 MG/ML SOLUTION: Performed by: SURGERY

## 2019-08-21 PROCEDURE — C8937 CAD BREAST MRI: HCPCS

## 2019-08-21 PROCEDURE — C8908 MRI W/O FOL W/CONT, BREAST,: HCPCS

## 2019-08-21 RX ADMIN — GADOBENATE DIMEGLUMINE 13 ML: 529 INJECTION, SOLUTION INTRAVENOUS at 16:22

## 2019-08-22 ENCOUNTER — TELEPHONE (OUTPATIENT)
Dept: MAMMOGRAPHY | Facility: CLINIC | Age: 64
End: 2019-08-22

## 2019-08-22 LAB — CREAT BLDA-MCNC: 0.5 MG/DL (ref 0.6–1.3)

## 2019-08-22 NOTE — TELEPHONE ENCOUNTER
I told her the MRI looked normal.  Her genetics appointment is in September and she will call us after those results come back.  She seems to be leaning towards surgical risk reduction.  I told her if her genetics is negative it is quite likely the insurance company would not pay for surgical risk reduction.  If it is positive it would not be a problem.

## 2019-08-22 NOTE — PROGRESS NOTES
Subjective   Megan Ratliff is a 63 y.o. female.     New pt ref by dr Carlos Young for abnormal thyroid  labs        Patient is 63-year-old female who was referred for thyroid consultation by Dr. Young.      In August 2019, she was seen by Dr. Young with complaints of 40 pound weight loss since April 2019.  Thyroid function tests done during that visit showed an elevated free T4 at 3.18 ng per DL, elevated free T3 at 11.6 pg/mL and TSH of less than 0.005.    She denies any anterior neck soreness.  She denies bowel changes.  She has occasional constipation.  She denies palpitations or tremors.  She denies hair, skin, or nail changes.  She denies insomnia.    Around 2000, she was found to have hypothyroidism by Dr. Wu while under therapy for hepatitis C.  Hepatitis C has been undetectable since.  She was on thyroid hormone until around 2009.  She has been released by Dr. Wu.    She was given IV contrast agent when she had a CT scan of the abdomen and pelvis on July 1, 2019.  CT scan showed a small ventral hernia, and diffuse hepatic steatosis.    The following portions of the patient's history were reviewed and updated as appropriate: allergies, current medications, past family history, past medical history, past social history, past surgical history and problem list.    Review of Systems   Constitutional: Negative.    Eyes: Negative.    Respiratory: Negative.    Cardiovascular: Negative.    Gastrointestinal: Negative.    Endocrine: Negative.    Genitourinary: Negative.    Musculoskeletal: Negative.    Skin: Negative.    Allergic/Immunologic: Negative.    Neurological: Positive for numbness (right arm ).   Hematological: Negative.    Psychiatric/Behavioral: The patient is nervous/anxious.        Objective      Vitals:    08/23/19 1608   BP: 108/60   BP Location: Right arm   Patient Position: Sitting   Cuff Size: Small Adult   Pulse: 79   SpO2: 99%   Weight: 69.2 kg (152 lb 9.6 oz)   Height: 170.2 cm  "(67.01\")     Physical Exam   Constitutional: She is oriented to person, place, and time. She appears well-developed and well-nourished. No distress.   HENT:   Head: Normocephalic.   Right Ear: External ear normal.   Left Ear: External ear normal.   Nose: Nose normal.   Mouth/Throat: Oropharynx is clear and moist. No oropharyngeal exudate.   Eyes: Conjunctivae and EOM are normal. Right eye exhibits no discharge. Left eye exhibits no discharge. No scleral icterus.   No exophthalmos   Neck: Neck supple. No JVD present. No tracheal deviation present. No thyromegaly present.   Cardiovascular: Normal rate, regular rhythm, normal heart sounds and intact distal pulses. Exam reveals no friction rub.   Pulmonary/Chest: Effort normal and breath sounds normal. No stridor. No respiratory distress. She has no wheezes. She has no rales.   Abdominal: Soft. Bowel sounds are normal. She exhibits no distension and no mass. There is no tenderness. There is no guarding.   Musculoskeletal: Normal range of motion. She exhibits no edema, tenderness or deformity.   Lymphadenopathy:     She has no cervical adenopathy.   Neurological: She is alert and oriented to person, place, and time. She displays normal reflexes. She exhibits normal muscle tone. Coordination normal.   Skin: Skin is warm and dry.   Psychiatric: She has a normal mood and affect. Her behavior is normal.     Hospital Outpatient Visit on 08/21/2019   Component Date Value Ref Range Status   • Creatinine 08/21/2019 0.50* 0.60 - 1.30 mg/dL Final    Serial Number: [id number]:  [id number]     Assessment/Plan   Megan was seen today for thyroid problem.    Diagnoses and all orders for this visit:    Hyperthyroidism  -     TSH  -     T4, Free  -     Comprehensive Metabolic Panel  -     Lipid Panel  -     Thyroid Peroxidase Antibody  -     Thyroid Stimulating Immunoglobulin    History of hepatitis C  -     Comprehensive Metabolic Panel    Hepatic steatosis  -     TSH  -     Lipid " Panel      Patient has hyperthyroidism.  Recheck thyroid function tests.  Check thyroid stimulating immunoglobulin and thyroid peroxidase antibody.  Patient had recent IV contrast exposure and will not be a candidate for radioactive iodine scanning or treatment in the near future.  Printed information on treatment of hyperthyroidism was given to the patient.    Patient has previous history of hypothyroidism which may be related to interferon therapy for hepatitis C. interferon therapy can cause transient hypothyroidism.    She has hepatic steatosis on recent CT of the abdomen but has normal transaminases.  Will check lipid profile.    Further recommendations to follow pending results of above tests.    Copy of my note sent to Dr. Hannah ROSAS 3 mos.

## 2019-08-23 ENCOUNTER — OFFICE VISIT (OUTPATIENT)
Dept: ENDOCRINOLOGY | Age: 64
End: 2019-08-23

## 2019-08-23 VITALS
DIASTOLIC BLOOD PRESSURE: 60 MMHG | HEART RATE: 79 BPM | OXYGEN SATURATION: 99 % | BODY MASS INDEX: 23.95 KG/M2 | SYSTOLIC BLOOD PRESSURE: 108 MMHG | WEIGHT: 152.6 LBS | HEIGHT: 67 IN

## 2019-08-23 DIAGNOSIS — K76.0 HEPATIC STEATOSIS: ICD-10-CM

## 2019-08-23 DIAGNOSIS — Z86.19 HISTORY OF HEPATITIS C: ICD-10-CM

## 2019-08-23 DIAGNOSIS — E05.90 HYPERTHYROIDISM: Primary | ICD-10-CM

## 2019-08-23 PROBLEM — Z96.659 FAILED TOTAL KNEE ARTHROPLASTY (HCC): Status: RESOLVED | Noted: 2017-05-24 | Resolved: 2019-08-23

## 2019-08-23 PROBLEM — T84.018A FAILED TOTAL KNEE ARTHROPLASTY (HCC): Status: RESOLVED | Noted: 2017-05-24 | Resolved: 2019-08-23

## 2019-08-23 PROBLEM — M75.121 COMPLETE TEAR OF RIGHT ROTATOR CUFF: Status: RESOLVED | Noted: 2018-05-04 | Resolved: 2019-08-23

## 2019-08-23 PROBLEM — S72.452K: Status: RESOLVED | Noted: 2017-06-07 | Resolved: 2019-08-23

## 2019-08-23 PROCEDURE — 99204 OFFICE O/P NEW MOD 45 MIN: CPT | Performed by: INTERNAL MEDICINE

## 2019-08-24 ENCOUNTER — APPOINTMENT (OUTPATIENT)
Dept: ULTRASOUND IMAGING | Facility: HOSPITAL | Age: 64
End: 2019-08-24

## 2019-08-28 RX ORDER — ESCITALOPRAM OXALATE 20 MG/1
20 TABLET ORAL DAILY
Qty: 90 TABLET | Refills: 1 | Status: SHIPPED | OUTPATIENT
Start: 2019-08-28 | End: 2020-02-19

## 2019-09-04 ENCOUNTER — HOSPITAL ENCOUNTER (OUTPATIENT)
Dept: ULTRASOUND IMAGING | Facility: HOSPITAL | Age: 64
End: 2019-09-04

## 2019-09-04 ENCOUNTER — HOSPITAL ENCOUNTER (OUTPATIENT)
Dept: ULTRASOUND IMAGING | Facility: HOSPITAL | Age: 64
Discharge: HOME | End: 2019-09-04
Admitting: INTERNAL MEDICINE

## 2019-09-04 ENCOUNTER — TRANSCRIBE ORDERS (OUTPATIENT)
Dept: ADMINISTRATIVE | Facility: HOSPITAL | Age: 64
End: 2019-09-04

## 2019-09-04 DIAGNOSIS — R79.89 ABNORMAL SERUM THYROXINE (T4) LEVEL: ICD-10-CM

## 2019-09-04 DIAGNOSIS — R79.89 ABNORMAL SERUM THYROXINE (T4) LEVEL: Primary | ICD-10-CM

## 2019-09-04 PROCEDURE — 76536 US EXAM OF HEAD AND NECK: CPT

## 2019-09-09 ENCOUNTER — OFFICE VISIT (OUTPATIENT)
Dept: SURGERY | Facility: CLINIC | Age: 64
End: 2019-09-09

## 2019-09-09 VITALS — BODY MASS INDEX: 23.1 KG/M2 | OXYGEN SATURATION: 99 % | HEART RATE: 66 BPM | WEIGHT: 147.2 LBS | HEIGHT: 67 IN

## 2019-09-09 DIAGNOSIS — E04.2 MULTIPLE THYROID NODULES: Primary | ICD-10-CM

## 2019-09-09 DIAGNOSIS — K43.9 HERNIA, EPIGASTRIC: Primary | ICD-10-CM

## 2019-09-09 PROCEDURE — 99213 OFFICE O/P EST LOW 20 MIN: CPT | Performed by: SURGERY

## 2019-09-09 RX ORDER — CEFAZOLIN SODIUM 2 G/100ML
2 INJECTION, SOLUTION INTRAVENOUS ONCE
Status: CANCELLED | OUTPATIENT
Start: 2019-09-20 | End: 2019-09-09

## 2019-09-09 NOTE — PROGRESS NOTES
SUMMARY (A/P):    63-year-old lady with symptomatic incarcerated epigastric hernia.  She wishes to proceed with open repair.  She understands nature the procedure and the risks including but not limited bleeding, infection, use of mesh, and recurrence.      CC:    Hernia    HPI:    63-year-old lady presents with several month history of mild to moderate pain in the supraumbilical region associated with palpable protrusion.    PSH:    -Laparoscopic right inguinal hernia repair 6/6/2018  -Right shoulder surgery  -Bilateral total knee replacement  -Hysterectomy  -Appendectomy  -Cholecystectomy  -EGD and colonoscopy 2018  -Right breast lumpectomy 2005    PMH:    Fibromyalgia  Rheumatoid arthritis  Anxiety  Hypothyroidism  Hypertension  Right breast cancer    FAMILY HISTORY:    Mother with breast and colon cancer    SOCIAL HISTORY:   Denies tobacco use  Occasional alcohol use    ALLERGIES: reviewed, in Epic    MEDICATIONS: reviewed, in Epic    ROS:  No chest pain or shortness of air.  All other systems reviewed and negative other than presenting complaints.    PHYSICAL EXAM:   Constitutional: Well-developed well-nourished, no acute distress  Vital signs: HR 66, weight 147 pounds, height 67 inches, BMI 23  Eyes: Conjunctiva normal, sclera nonicteric  ENMT: Hearing grossly normal, oral mucosa moist  Neck: Supple, no palpable mass, trachea midline  Respiratory: Clear to auscultation, normal inspiratory effort  Cardiovascular: Regular rate, no jugular venous distention  Gastrointestinal: Soft, nontender, 2 cm palpable mass approximately 3 cm superior and just to the right of the umbilicus consistent with incarcerated epigastric hernia  Lymphatics (palpable nodes):  cervical-negative, axillary-negative  Skin:  Warm, dry, no rash on visualized skin surfaces  Musculoskeletal: Symmetric strength, normal gait  Psychiatric: Alert and oriented ×3, normal affect     EMANI COVARRUBIAS M.D.

## 2019-09-11 ENCOUNTER — APPOINTMENT (OUTPATIENT)
Dept: PREADMISSION TESTING | Facility: HOSPITAL | Age: 64
End: 2019-09-11

## 2019-09-11 VITALS
DIASTOLIC BLOOD PRESSURE: 70 MMHG | TEMPERATURE: 98.7 F | RESPIRATION RATE: 18 BRPM | HEART RATE: 81 BPM | WEIGHT: 149 LBS | BODY MASS INDEX: 23.39 KG/M2 | SYSTOLIC BLOOD PRESSURE: 104 MMHG | OXYGEN SATURATION: 97 % | HEIGHT: 67 IN

## 2019-09-11 DIAGNOSIS — I10 HYPERTENSION, UNSPECIFIED TYPE: ICD-10-CM

## 2019-09-11 DIAGNOSIS — E05.90 HYPERTHYROIDISM: Primary | ICD-10-CM

## 2019-09-11 LAB
ALBUMIN SERPL-MCNC: 3.9 G/DL (ref 3.5–5.2)
ALBUMIN/GLOB SERPL: 1.6 G/DL
ALP SERPL-CCNC: 130 U/L (ref 39–117)
ALT SERPL-CCNC: 16 U/L (ref 1–33)
ANION GAP SERPL CALCULATED.3IONS-SCNC: 10.8 MMOL/L (ref 5–15)
AST SERPL-CCNC: 20 U/L (ref 1–32)
BILIRUB SERPL-MCNC: 0.3 MG/DL (ref 0.2–1.2)
BUN BLD-MCNC: 11 MG/DL (ref 8–23)
BUN SERPL-MCNC: 13 MG/DL (ref 8–23)
BUN/CREAT SERPL: 20.4 (ref 7–25)
BUN/CREAT SERPL: 24.1 (ref 7–25)
CALCIUM SERPL-MCNC: 9.3 MG/DL (ref 8.6–10.5)
CALCIUM SPEC-SCNC: 9.2 MG/DL (ref 8.6–10.5)
CHLORIDE SERPL-SCNC: 102 MMOL/L (ref 98–107)
CHLORIDE SERPL-SCNC: 99 MMOL/L (ref 98–107)
CHOLEST SERPL-MCNC: 137 MG/DL (ref 0–200)
CO2 SERPL-SCNC: 26.2 MMOL/L (ref 22–29)
CO2 SERPL-SCNC: 28.2 MMOL/L (ref 22–29)
CREAT BLD-MCNC: 0.54 MG/DL (ref 0.57–1)
CREAT SERPL-MCNC: 0.54 MG/DL (ref 0.57–1)
DEPRECATED RDW RBC AUTO: 45.8 FL (ref 37–54)
ERYTHROCYTE [DISTWIDTH] IN BLOOD BY AUTOMATED COUNT: 13.6 % (ref 12.3–15.4)
GFR SERPL CREATININE-BSD FRML MDRD: 114 ML/MIN/1.73
GLOBULIN SER CALC-MCNC: 2.4 GM/DL
GLUCOSE BLD-MCNC: 97 MG/DL (ref 65–99)
GLUCOSE SERPL-MCNC: 103 MG/DL (ref 65–99)
HCT VFR BLD AUTO: 39.4 % (ref 34–46.6)
HDLC SERPL-MCNC: 63 MG/DL (ref 40–60)
HGB BLD-MCNC: 12.7 G/DL (ref 12–15.9)
INTERPRETATION: NORMAL
LDLC SERPL CALC-MCNC: 59 MG/DL (ref 0–100)
MCH RBC QN AUTO: 29.6 PG (ref 26.6–33)
MCHC RBC AUTO-ENTMCNC: 32.2 G/DL (ref 31.5–35.7)
MCV RBC AUTO: 91.8 FL (ref 79–97)
PLATELET # BLD AUTO: 251 10*3/MM3 (ref 140–450)
PMV BLD AUTO: 9.9 FL (ref 6–12)
POTASSIUM BLD-SCNC: 4.2 MMOL/L (ref 3.5–5.2)
POTASSIUM SERPL-SCNC: 4.8 MMOL/L (ref 3.5–5.2)
PROT SERPL-MCNC: 6.3 G/DL (ref 6–8.5)
RBC # BLD AUTO: 4.29 10*6/MM3 (ref 3.77–5.28)
SODIUM BLD-SCNC: 138 MMOL/L (ref 136–145)
SODIUM SERPL-SCNC: 141 MMOL/L (ref 136–145)
T4 FREE SERPL-MCNC: 1.95 NG/DL (ref 0.93–1.7)
THYROPEROXIDASE AB SERPL-ACNC: 288 IU/ML (ref 0–34)
TRIGL SERPL-MCNC: 74 MG/DL (ref 0–150)
TSH SERPL DL<=0.005 MIU/L-ACNC: <0.005 UIU/ML (ref 0.27–4.2)
TSI SER-ACNC: 4.13 IU/L (ref 0–0.55)
VLDLC SERPL CALC-MCNC: 14.8 MG/DL
WBC NRBC COR # BLD: 4.08 10*3/MM3 (ref 3.4–10.8)

## 2019-09-11 PROCEDURE — 93010 ELECTROCARDIOGRAM REPORT: CPT | Performed by: INTERNAL MEDICINE

## 2019-09-11 PROCEDURE — 36415 COLL VENOUS BLD VENIPUNCTURE: CPT

## 2019-09-11 PROCEDURE — 85027 COMPLETE CBC AUTOMATED: CPT | Performed by: SURGERY

## 2019-09-11 PROCEDURE — 93005 ELECTROCARDIOGRAM TRACING: CPT

## 2019-09-11 PROCEDURE — 80048 BASIC METABOLIC PNL TOTAL CA: CPT | Performed by: SURGERY

## 2019-09-11 RX ORDER — METHIMAZOLE 10 MG/1
TABLET ORAL
Qty: 180 TABLET | Refills: 1 | Status: ON HOLD | OUTPATIENT
Start: 2019-09-11 | End: 2019-09-20

## 2019-09-11 NOTE — DISCHARGE INSTRUCTIONS
Take the following medications the morning of surgery with a small sip of water: NONE    ARRIVE AT 7AM    General Instructions:  • Do not eat solid food after midnight the night before surgery.  • You may drink clear liquids day of surgery but must stop at least one hour before your hospital arrival time.  • It is beneficial for you to have a clear drink that contains carbohydrates the day of surgery.  We suggest a 12 to 20 ounce bottle of Gatorade or Powerade for non-diabetic patients or a 12 to 20 ounce bottle of G2 or Powerade Zero for diabetic patients. (Pediatric patients, are not advised to drink a 12 to 20 ounce carbohydrate drink)    Clear liquids are liquids you can see through.  Nothing red in color.     Plain water                               Sports drinks  Sodas                                   Gelatin (Jell-O)  Fruit juices without pulp such as white grape juice and apple juice  Popsicles that contain no fruit or yogurt  Tea or coffee (no cream or milk added)  Gatorade / Powerade  G2 / Powerade Zero    • Infants may have breast milk up to four hours before surgery.  • Infants drinking formula may drink formula up to six hours before surgery.   • Patients who avoid smoking, chewing tobacco and alcohol for 4 weeks prior to surgery have a reduced risk of post-operative complications.  Quit smoking as many days before surgery as you can.  • Do not smoke, use chewing tobacco or drink alcohol the day of surgery.   • If applicable bring your C-PAP/ BI-PAP machine.  • Bring any papers given to you in the doctor’s office.  • Wear clean comfortable clothes and socks.  • Do not wear contact lenses, false eyelashes or make-up.  Bring a case for your glasses.   • Bring crutches or walker if applicable.  • Remove all piercings.  Leave jewelry and any other valuables at home.  • Hair extensions with metal clips must be removed prior to surgery.  • The Pre-Admission Testing nurse will instruct you to bring  medications if unable to obtain an accurate list in Pre-Admission Testing.          Preventing a Surgical Site Infection:  • For 2 to 3 days before surgery, avoid shaving with a razor because the razor can irritate skin and make it easier to develop an infection.    • Any areas of open skin can increase the risk of a post-operative wound infection by allowing bacteria to enter and travel throughout the body.  Notify your surgeon if you have any skin wounds / rashes even if it is not near the expected surgical site.  The area will need assessed to determine if surgery should be delayed until it is healed.  • The night prior to surgery sleep in a clean bed with clean clothing.  Do not allow pets to sleep with you.  • Shower on the morning of surgery using a fresh bar of anti-bacterial soap (such as Dial) and clean washcloth.  Dry with a clean towel and dress in clean clothing.  • Ask your surgeon if you will be receiving antibiotics prior to surgery.  • Make sure you, your family, and all healthcare providers clean their hands with soap and water or an alcohol based hand  before caring for you or your wound.    Day of surgery:  Upon arrival, a Pre-op nurse and Anesthesiologist will review your health history, obtain vital signs, and answer questions you may have.  The only belongings needed at this time will be a list of your home medications and if applicable your C-PAP/BI-PAP machine.  If you are staying overnight your family can leave the rest of your belongings in the car and bring them to your room later.  A Pre-op nurse will start an IV and you may receive medication in preparation for surgery, including something to help you relax.  Your family will be able to see you in the Pre-op area.  While you are in surgery your family should notify the waiting room  if they leave the waiting room area and provide a contact phone number.    Please be aware that surgery does come with discomfort.  We  want to make every effort to control your discomfort so please discuss any uncontrolled symptoms with your nurse.   Your doctor will most likely have prescribed pain medications.      If you are going home after surgery you will receive individualized written care instructions before being discharged.  A responsible adult must drive you to and from the hospital on the day of your surgery and stay with you for 24 hours.    If you are staying overnight following surgery, you will be transported to your hospital room following the recovery period.  [facility] has all private rooms.    You have received a list of surgical assistants for your reference.  If you have any questions please call Pre-Admission Testing at [phone].  Deductibles and co-payments are collected on the day of service. Please be prepared to pay the required co-pay, deductible or deposit on the day of service as defined by your plan.  2% CHLORAHEXIDINE GLUCONATE* CLOTH  Preparing or “prepping” skin before surgery can reduce the risk of infection at the surgical site. To make the process easier, [facility] has chosen disposable cloths moistened with a rinse-free, 2% Chlorhexidine Gluconate (CHG) antiseptic solution. The steps below outline the prepping process and should be carefully followed.        Use the prep cloth on the area that is circled in the diagram             Directions Night before Surgery  1) Shower using a fresh bar of anti-bacterial soap (such as Dial) and clean washcloth.  Use a clean towel to completely dry your skin.  2) Do not use any lotions, oils or creams on your skin.  3) Open the package and remove 1 cloth, wipe your skin for 30 seconds in a circular motion.  Allow to dry for 3 minutes.  4) Repeat #3 with second cloth.  5) Do not touch your eyes, ears, or mouth with the prep cloth.  6) Allow the wet prep solution to air dry.  7) Discard the prep cloth and wash your hands with soap and water.    8) Dress in clean bed clothes and sleep on fresh clean bed sheets.   9) You may experience some temporary itching after the prep.    Directions Day of Surgery  1) Repeat steps 1,2,3,4,5,6,7, and 9.   2) Dress in clean clothes before coming to the hospital.

## 2019-09-17 ENCOUNTER — CLINICAL SUPPORT (OUTPATIENT)
Dept: OTHER | Facility: HOSPITAL | Age: 64
End: 2019-09-17

## 2019-09-17 DIAGNOSIS — Z80.0 FAMILY HISTORY OF MALIGNANT NEOPLASM OF GASTROINTESTINAL TRACT: ICD-10-CM

## 2019-09-17 DIAGNOSIS — Z80.3 FAMILY HISTORY OF MALIGNANT NEOPLASM OF BREAST: Primary | ICD-10-CM

## 2019-09-17 PROCEDURE — G0463 HOSPITAL OUTPT CLINIC VISIT: HCPCS

## 2019-09-17 NOTE — PATIENT INSTRUCTIONS
Pt seen by Dr. Verduzco for genetic counseling and testing. Lab drawn with 21g butterfly needle in Left AC x 1 attempt. Sent to [facility]. Pt informed she would receive a phone call when test results.

## 2019-09-20 ENCOUNTER — HOSPITAL ENCOUNTER (OUTPATIENT)
Facility: HOSPITAL | Age: 64
Setting detail: HOSPITAL OUTPATIENT SURGERY
Discharge: HOME | End: 2019-09-20
Attending: SURGERY | Admitting: SURGERY

## 2019-09-20 ENCOUNTER — ANESTHESIA (OUTPATIENT)
Dept: PERIOP | Facility: HOSPITAL | Age: 64
End: 2019-09-20

## 2019-09-20 ENCOUNTER — ANESTHESIA EVENT (OUTPATIENT)
Dept: PERIOP | Facility: HOSPITAL | Age: 64
End: 2019-09-20

## 2019-09-20 VITALS
DIASTOLIC BLOOD PRESSURE: 54 MMHG | TEMPERATURE: 98.2 F | RESPIRATION RATE: 16 BRPM | HEART RATE: 72 BPM | OXYGEN SATURATION: 94 % | SYSTOLIC BLOOD PRESSURE: 95 MMHG

## 2019-09-20 DIAGNOSIS — K43.9 HERNIA, EPIGASTRIC: ICD-10-CM

## 2019-09-20 PROCEDURE — 25010000002 MIDAZOLAM PER 1 MG: Performed by: NURSE ANESTHETIST, CERTIFIED REGISTERED

## 2019-09-20 PROCEDURE — 25010000002 FENTANYL CITRATE (PF) 100 MCG/2ML SOLUTION: Performed by: NURSE ANESTHETIST, CERTIFIED REGISTERED

## 2019-09-20 PROCEDURE — 49570 PR REPAIR EPIGASTRIC HERNIA,REDUC: CPT | Performed by: PHYSICIAN ASSISTANT

## 2019-09-20 PROCEDURE — 49570 PR REPAIR EPIGASTRIC HERNIA,REDUC: CPT | Performed by: SURGERY

## 2019-09-20 PROCEDURE — 25010000002 PROPOFOL 10 MG/ML EMULSION: Performed by: NURSE ANESTHETIST, CERTIFIED REGISTERED

## 2019-09-20 PROCEDURE — 25010000002 MIDAZOLAM PER 1 MG: Performed by: ANESTHESIOLOGY

## 2019-09-20 PROCEDURE — 25010000003 CEFAZOLIN IN DEXTROSE 2-4 GM/100ML-% SOLUTION: Performed by: SURGERY

## 2019-09-20 RX ORDER — BUPIVACAINE HYDROCHLORIDE AND EPINEPHRINE 5; 5 MG/ML; UG/ML
INJECTION, SOLUTION PERINEURAL AS NEEDED
Status: DISCONTINUED | OUTPATIENT
Start: 2019-09-20 | End: 2019-09-20 | Stop reason: HOSPADM

## 2019-09-20 RX ORDER — PROMETHAZINE HYDROCHLORIDE 25 MG/1
25 SUPPOSITORY RECTAL ONCE AS NEEDED
Status: DISCONTINUED | OUTPATIENT
Start: 2019-09-20 | End: 2019-09-20 | Stop reason: HOSPADM

## 2019-09-20 RX ORDER — EPHEDRINE SULFATE 50 MG/ML
5 INJECTION, SOLUTION INTRAVENOUS ONCE AS NEEDED
Status: DISCONTINUED | OUTPATIENT
Start: 2019-09-20 | End: 2019-09-20 | Stop reason: HOSPADM

## 2019-09-20 RX ORDER — MIDAZOLAM HYDROCHLORIDE 1 MG/ML
INJECTION INTRAMUSCULAR; INTRAVENOUS AS NEEDED
Status: DISCONTINUED | OUTPATIENT
Start: 2019-09-20 | End: 2019-09-20 | Stop reason: SURG

## 2019-09-20 RX ORDER — ALBUTEROL SULFATE 2.5 MG/3ML
2.5 SOLUTION RESPIRATORY (INHALATION) ONCE AS NEEDED
Status: DISCONTINUED | OUTPATIENT
Start: 2019-09-20 | End: 2019-09-20 | Stop reason: HOSPADM

## 2019-09-20 RX ORDER — SODIUM CHLORIDE 0.9 % (FLUSH) 0.9 %
3-10 SYRINGE (ML) INJECTION AS NEEDED
Status: DISCONTINUED | OUTPATIENT
Start: 2019-09-20 | End: 2019-09-20 | Stop reason: HOSPADM

## 2019-09-20 RX ORDER — FENTANYL CITRATE 50 UG/ML
50 INJECTION, SOLUTION INTRAMUSCULAR; INTRAVENOUS
Status: DISCONTINUED | OUTPATIENT
Start: 2019-09-20 | End: 2019-09-20 | Stop reason: HOSPADM

## 2019-09-20 RX ORDER — HYDRALAZINE HYDROCHLORIDE 20 MG/ML
5 INJECTION INTRAMUSCULAR; INTRAVENOUS
Status: DISCONTINUED | OUTPATIENT
Start: 2019-09-20 | End: 2019-09-20 | Stop reason: HOSPADM

## 2019-09-20 RX ORDER — HYDROCODONE BITARTRATE AND ACETAMINOPHEN 5; 325 MG/1; MG/1
TABLET ORAL
Qty: 30 TABLET | Refills: 0 | Status: SHIPPED | OUTPATIENT
Start: 2019-09-20 | End: 2019-09-26

## 2019-09-20 RX ORDER — ACETAMINOPHEN 650 MG/1
650 SUPPOSITORY RECTAL ONCE AS NEEDED
Status: DISCONTINUED | OUTPATIENT
Start: 2019-09-20 | End: 2019-09-20 | Stop reason: HOSPADM

## 2019-09-20 RX ORDER — MIDAZOLAM HYDROCHLORIDE 1 MG/ML
2 INJECTION INTRAMUSCULAR; INTRAVENOUS
Status: DISCONTINUED | OUTPATIENT
Start: 2019-09-20 | End: 2019-09-20 | Stop reason: HOSPADM

## 2019-09-20 RX ORDER — PROPOFOL 10 MG/ML
VIAL (ML) INTRAVENOUS CONTINUOUS PRN
Status: DISCONTINUED | OUTPATIENT
Start: 2019-09-20 | End: 2019-09-20 | Stop reason: SURG

## 2019-09-20 RX ORDER — CEFAZOLIN SODIUM 2 G/100ML
2 INJECTION, SOLUTION INTRAVENOUS ONCE
Status: COMPLETED | OUTPATIENT
Start: 2019-09-20 | End: 2019-09-20

## 2019-09-20 RX ORDER — MIDAZOLAM HYDROCHLORIDE 1 MG/ML
1 INJECTION INTRAMUSCULAR; INTRAVENOUS
Status: DISCONTINUED | OUTPATIENT
Start: 2019-09-20 | End: 2019-09-20 | Stop reason: HOSPADM

## 2019-09-20 RX ORDER — SODIUM CHLORIDE 0.9 % (FLUSH) 0.9 %
3 SYRINGE (ML) INJECTION EVERY 12 HOURS SCHEDULED
Status: DISCONTINUED | OUTPATIENT
Start: 2019-09-20 | End: 2019-09-20 | Stop reason: HOSPADM

## 2019-09-20 RX ORDER — ONDANSETRON 2 MG/ML
4 INJECTION INTRAMUSCULAR; INTRAVENOUS ONCE AS NEEDED
Status: DISCONTINUED | OUTPATIENT
Start: 2019-09-20 | End: 2019-09-20 | Stop reason: HOSPADM

## 2019-09-20 RX ORDER — LIDOCAINE HYDROCHLORIDE 10 MG/ML
0.5 INJECTION, SOLUTION EPIDURAL; INFILTRATION; INTRACAUDAL; PERINEURAL ONCE AS NEEDED
Status: DISCONTINUED | OUTPATIENT
Start: 2019-09-20 | End: 2019-09-20 | Stop reason: HOSPADM

## 2019-09-20 RX ORDER — FENTANYL CITRATE 50 UG/ML
INJECTION, SOLUTION INTRAMUSCULAR; INTRAVENOUS AS NEEDED
Status: DISCONTINUED | OUTPATIENT
Start: 2019-09-20 | End: 2019-09-20 | Stop reason: SURG

## 2019-09-20 RX ORDER — SODIUM CHLORIDE, SODIUM LACTATE, POTASSIUM CHLORIDE, CALCIUM CHLORIDE 600; 310; 30; 20 MG/100ML; MG/100ML; MG/100ML; MG/100ML
9 INJECTION, SOLUTION INTRAVENOUS CONTINUOUS
Status: DISCONTINUED | OUTPATIENT
Start: 2019-09-20 | End: 2019-09-20 | Stop reason: HOSPADM

## 2019-09-20 RX ORDER — HYDROCODONE BITARTRATE AND ACETAMINOPHEN 7.5; 325 MG/1; MG/1
1 TABLET ORAL ONCE AS NEEDED
Status: COMPLETED | OUTPATIENT
Start: 2019-09-20 | End: 2019-09-20

## 2019-09-20 RX ORDER — FAMOTIDINE 10 MG/ML
20 INJECTION, SOLUTION INTRAVENOUS ONCE
Status: COMPLETED | OUTPATIENT
Start: 2019-09-20 | End: 2019-09-20

## 2019-09-20 RX ORDER — SODIUM CHLORIDE, SODIUM LACTATE, POTASSIUM CHLORIDE, CALCIUM CHLORIDE 600; 310; 30; 20 MG/100ML; MG/100ML; MG/100ML; MG/100ML
100 INJECTION, SOLUTION INTRAVENOUS CONTINUOUS
Status: DISCONTINUED | OUTPATIENT
Start: 2019-09-20 | End: 2019-09-20 | Stop reason: HOSPADM

## 2019-09-20 RX ORDER — MAGNESIUM HYDROXIDE 1200 MG/15ML
LIQUID ORAL AS NEEDED
Status: DISCONTINUED | OUTPATIENT
Start: 2019-09-20 | End: 2019-09-20 | Stop reason: HOSPADM

## 2019-09-20 RX ORDER — PROMETHAZINE HYDROCHLORIDE 25 MG/ML
6.25 INJECTION, SOLUTION INTRAMUSCULAR; INTRAVENOUS
Status: DISCONTINUED | OUTPATIENT
Start: 2019-09-20 | End: 2019-09-20 | Stop reason: HOSPADM

## 2019-09-20 RX ORDER — PROMETHAZINE HYDROCHLORIDE 25 MG/1
25 TABLET ORAL ONCE AS NEEDED
Status: DISCONTINUED | OUTPATIENT
Start: 2019-09-20 | End: 2019-09-20 | Stop reason: HOSPADM

## 2019-09-20 RX ORDER — NALOXONE HCL 0.4 MG/ML
0.2 VIAL (ML) INJECTION AS NEEDED
Status: DISCONTINUED | OUTPATIENT
Start: 2019-09-20 | End: 2019-09-20 | Stop reason: HOSPADM

## 2019-09-20 RX ORDER — DIPHENHYDRAMINE HCL 25 MG
25 CAPSULE ORAL
Status: DISCONTINUED | OUTPATIENT
Start: 2019-09-20 | End: 2019-09-20 | Stop reason: HOSPADM

## 2019-09-20 RX ORDER — ACETAMINOPHEN 325 MG/1
650 TABLET ORAL ONCE AS NEEDED
Status: DISCONTINUED | OUTPATIENT
Start: 2019-09-20 | End: 2019-09-20 | Stop reason: HOSPADM

## 2019-09-20 RX ORDER — ONDANSETRON 4 MG/1
4 TABLET, FILM COATED ORAL EVERY 6 HOURS PRN
Qty: 10 TABLET | Refills: 1 | Status: SHIPPED | OUTPATIENT
Start: 2019-09-20 | End: 2019-09-26

## 2019-09-20 RX ORDER — HYDROCODONE BITARTRATE AND ACETAMINOPHEN 10; 325 MG/1; MG/1
1 TABLET ORAL EVERY 4 HOURS PRN
Status: DISCONTINUED | OUTPATIENT
Start: 2019-09-20 | End: 2019-09-20 | Stop reason: HOSPADM

## 2019-09-20 RX ORDER — FLUMAZENIL 0.1 MG/ML
0.2 INJECTION INTRAVENOUS AS NEEDED
Status: DISCONTINUED | OUTPATIENT
Start: 2019-09-20 | End: 2019-09-20 | Stop reason: HOSPADM

## 2019-09-20 RX ORDER — PROMETHAZINE HYDROCHLORIDE 25 MG/ML
12.5 INJECTION, SOLUTION INTRAMUSCULAR; INTRAVENOUS ONCE AS NEEDED
Status: DISCONTINUED | OUTPATIENT
Start: 2019-09-20 | End: 2019-09-20 | Stop reason: HOSPADM

## 2019-09-20 RX ORDER — DIPHENHYDRAMINE HYDROCHLORIDE 50 MG/ML
12.5 INJECTION INTRAMUSCULAR; INTRAVENOUS
Status: DISCONTINUED | OUTPATIENT
Start: 2019-09-20 | End: 2019-09-20 | Stop reason: HOSPADM

## 2019-09-20 RX ORDER — MEPERIDINE HYDROCHLORIDE 25 MG/ML
12.5 INJECTION INTRAMUSCULAR; INTRAVENOUS; SUBCUTANEOUS
Status: DISCONTINUED | OUTPATIENT
Start: 2019-09-20 | End: 2019-09-20 | Stop reason: HOSPADM

## 2019-09-20 RX ADMIN — SODIUM CHLORIDE, POTASSIUM CHLORIDE, SODIUM LACTATE AND CALCIUM CHLORIDE 9 ML/HR: 600; 310; 30; 20 INJECTION, SOLUTION INTRAVENOUS at 08:27

## 2019-09-20 RX ADMIN — FENTANYL CITRATE 50 MCG: 50 INJECTION INTRAMUSCULAR; INTRAVENOUS at 09:40

## 2019-09-20 RX ADMIN — FENTANYL CITRATE 50 MCG: 50 INJECTION INTRAMUSCULAR; INTRAVENOUS at 09:26

## 2019-09-20 RX ADMIN — MIDAZOLAM 1 MG: 1 INJECTION INTRAMUSCULAR; INTRAVENOUS at 08:36

## 2019-09-20 RX ADMIN — MIDAZOLAM 1 MG: 1 INJECTION INTRAMUSCULAR; INTRAVENOUS at 09:26

## 2019-09-20 RX ADMIN — PROPOFOL 140 MCG/KG/MIN: 10 INJECTION, EMULSION INTRAVENOUS at 09:26

## 2019-09-20 RX ADMIN — CEFAZOLIN SODIUM 2 G: 2 INJECTION, SOLUTION INTRAVENOUS at 09:18

## 2019-09-20 RX ADMIN — HYDROCODONE BITARTRATE AND ACETAMINOPHEN 1 TABLET: 7.5; 325 TABLET ORAL at 11:20

## 2019-09-20 RX ADMIN — MIDAZOLAM 1 MG: 1 INJECTION INTRAMUSCULAR; INTRAVENOUS at 09:40

## 2019-09-20 RX ADMIN — MIDAZOLAM 1 MG: 1 INJECTION INTRAMUSCULAR; INTRAVENOUS at 08:40

## 2019-09-20 RX ADMIN — FAMOTIDINE 20 MG: 10 INJECTION, SOLUTION INTRAVENOUS at 08:35

## 2019-09-20 NOTE — ANESTHESIA PREPROCEDURE EVALUATION
Anesthesia Evaluation     Patient summary reviewed and Nursing notes reviewed   history of anesthetic complications: PONV  NPO Solid Status: > 8 hours  NPO Liquid Status: > 2 hours           Airway   Mallampati: II  TM distance: >3 FB  Neck ROM: full  Dental - normal exam     Pulmonary - normal exam   (+) a smoker Former,   Cardiovascular - normal exam    ECG reviewed    (+) hypertension,       Neuro/Psych  (+) numbness, psychiatric history Anxiety,     GI/Hepatic/Renal/Endo    (+)   hepatitis C, liver disease, hyperthyroidism    Musculoskeletal     Abdominal    Substance History - negative use     OB/GYN negative ob/gyn ROS         Other   (+) arthritis   history of cancer                    Anesthesia Plan    ASA 3     MAC     Anesthetic plan, all risks, benefits, and alternatives have been provided, discussed and informed consent has been obtained with: patient.

## 2019-09-20 NOTE — DISCHARGE INSTRUCTIONS
Dr. Rishabh Dong  [address]  [place], [place] [id number] [phone]    Discharge Instructions for Hernia Surgery      1. Go home, rest and take it easy today; however, you should get up and move about several times today to reduce the risk of developing a clot in your legs.      2. You may experience some dizziness or memory loss from the anesthesia.  This may last for the next 24 hours.  Someone should plan on staying with you for the first 24 hours for your safety.    3. Do not make any important legal decisions or sign any legal papers for the next 24 hours.      4. Eat and drink lightly today.  Start off with liquids, jello, soup, crackers or other bland foods at first. You may advance your diet tomorrow as tolerated as long as you do not experience any nausea or vomiting.     5. If skin glue (Dermabond) was used, your incisions are protected and covered.  The invisible glue will dissolve on its own as your incision heals. If dressings were used, you may remove your outer dressings in 3 days.  The white tapes called steri-strips should stay in place.  They will fall off on their own in 1-2 weeks.  Do not worry if they come off sooner.      6. If dressings were used, you may notice some bleeding/drainage on your outer dressings. A little bloody drainage is normal. If the bleeding/drainage is such that the bandage cannot absorb it, remove the dressing, apply clean gauze and apply firm pressure for a full 15 minutes.  If the bleeding continues, please call me.    7. You may shower tomorrow allowing water to run over the incisions; however, do not scrub the incisions.  No tub baths until your incisions are completely healed.      8. No lifting > 20 lbs. until you are seen at your follow-up visit.         9. You have received a prescription for a narcotic pain medicine, as you will have some pain following surgery.   You will not be totally pain free, but your pain medicine should make the pain  tolerable.  Please take your pain medicine as prescribed and always take your pills with food to prevent nausea. If you are having severe pain that cannot be controlled by the pain medicine, please contact me.      10. You have also received a prescription for an anti-nausea medicine.  Please take this as prescribed for any nausea or vomiting.  Nausea could be a result of the anesthesia or a result of the narcotic pain medicine.  If you experience severe nausea and vomiting that cannot be controlled by the nausea medicine, please call me.      11. If you had a laparoscopic surgery, it is not unusual to experience pain/discomfort in your shoulders or under your ribs after surgery.  It is from the gas used during the laparoscopic procedure and usually lasts 1-3 days.  The prescription pain medicine is used to treat the surgical pain and does not typically alleviate this “gassy” pain.     12. No driving for 24 hours and for as long as you are taking your prescription pain medicine.              13. You will need to call the office at [phone] to schedule a follow-up appointment in 6-10 days.     14. Remember to contact me for any of the following:    • Fever > 101 degrees  • Severe pain that cannot be controlled by taking your pain pills  • Severe nausea or vomiting that cannot be controlled by taking your nausea pills  • Significant bleeding of your incisions  • Drainage that has a bad smell or is yellow or green in appearance  • Any other questions or concerns        Additional Instruction for Inguinal Hernia Patients Only    1. If you did not urinate at the hospital after your surgery or if you feel the need to urinate and cannot, this will necessitate a return to the Emergency Room for placement of a urinary catheter.  You should also notify me as well.  As a rule, you should be able to empty your bladder within 4-6 hours after discharge from the hospital.      2. You may notice some scrotal bruising and/or  swelling. A scrotal support or briefs as well as ice packs may be used to alleviate discomfort.

## 2019-09-20 NOTE — OP NOTE
PREOPERATIVE DIAGNOSIS:  Epigastric hernia    POSTOPERATIVE DIAGNOSIS (FINDINGS):  Same    PROCEDURE:  Open epigastric hernia repair    SURGEON:  Rishabh Dong MD    ASSISTANT:  Claudy Mcdonald    ANESTHESIA:  Ge monitored sedation neral    EBL:  Minimal    SPECIMEN(S):  none    DESCRIPTION:  In supine position under sedation prepped and draped usual sterile manner.  Elliptical incision made excising her previous supraumbilical cutaneous scar from laparoscopy.  Incision carried down into the right where a moderate size herniated portion of preperitoneal fat was identified and circumferentially dissected down to the level of the fascia.  The herniated fat was reduced and the fascial defect measured 1 cm.  This was closed with interrupted 0 Ethibond.  Good hemostasis was noted.  Skin was closed with 3-0 Vicryl deep dermal and 5-0 Vicryl subcuticular followed by Dermabond.  Tolerated well.    Rishabh Dong M.D.

## 2019-09-20 NOTE — ANESTHESIA POSTPROCEDURE EVALUATION
Patient: Megan Ratliff    Procedure Summary     Date:  09/20/19 Room / Location:   [facility]    Anesthesia Start:  0918 Anesthesia Stop:  1003    Procedure:  EPIGASTRIC HERNIA REPAIR (N/A Abdomen) Diagnosis:       Hernia, epigastric      (Hernia, epigastric [K43.9])    Surgeon:  Rishabh Dong MD Provider:  Sanchez Cortez MD    Anesthesia Type:  MAC ASA Status:  3          Anesthesia Type: MAC  Last vitals  BP   99/59 (09/20/19 1010)   Temp   36.8 °C (98.2 °F) (09/20/19 0751)   Pulse   71 (09/20/19 1010)   Resp   16 (09/20/19 1010)     SpO2   97 % (09/20/19 1010)     Post Anesthesia Care and Evaluation    Patient location during evaluation: bedside  Patient participation: complete - patient participated  Level of consciousness: awake  Pain score: 2  Pain management: adequate  Airway patency: patent  Anesthetic complications: No anesthetic complications  PONV Status: none  Cardiovascular status: acceptable  Respiratory status: acceptable  Hydration status: acceptable    Comments: BP 99/59 (BP Location: Left arm, Patient Position: Lying)   Pulse 71   Temp 36.8 °C (98.2 °F) (Oral)   Resp 16   SpO2 97%

## 2019-09-26 ENCOUNTER — OFFICE VISIT (OUTPATIENT)
Dept: SURGERY | Facility: CLINIC | Age: 64
End: 2019-09-26

## 2019-09-26 DIAGNOSIS — Z09 FOLLOW UP: Primary | ICD-10-CM

## 2019-09-26 PROCEDURE — 99024 POSTOP FOLLOW-UP VISIT: CPT | Performed by: SURGERY

## 2019-09-26 RX ORDER — PUMPKIN SEED EXTRACT/SOY GERM 300 MG
CAPSULE ORAL
COMMUNITY
End: 2019-10-28

## 2019-09-26 RX ORDER — SULFAMETHOXAZOLE AND TRIMETHOPRIM 800; 160 MG/1; MG/1
1 TABLET ORAL 2 TIMES DAILY
Qty: 10 TABLET | Refills: 0 | Status: SHIPPED | OUTPATIENT
Start: 2019-09-26 | End: 2019-10-01

## 2019-09-26 NOTE — PROGRESS NOTES
Open epigastric hernia repair 9/20/2019    Incision is healing well and she is doing well, reporting no problems from the surgery.  We discussed activity restrictions.  Follow-up as needed.

## 2019-10-28 ENCOUNTER — OFFICE VISIT (OUTPATIENT)
Dept: GASTROENTEROLOGY | Facility: CLINIC | Age: 64
End: 2019-10-28

## 2019-10-28 VITALS
DIASTOLIC BLOOD PRESSURE: 77 MMHG | WEIGHT: 145 LBS | HEART RATE: 67 BPM | BODY MASS INDEX: 22.76 KG/M2 | SYSTOLIC BLOOD PRESSURE: 130 MMHG | HEIGHT: 67 IN

## 2019-10-28 DIAGNOSIS — R11.2 PONV (POSTOPERATIVE NAUSEA AND VOMITING): ICD-10-CM

## 2019-10-28 DIAGNOSIS — R13.19 ESOPHAGEAL DYSPHAGIA: ICD-10-CM

## 2019-10-28 DIAGNOSIS — Z98.890 PONV (POSTOPERATIVE NAUSEA AND VOMITING): ICD-10-CM

## 2019-10-28 DIAGNOSIS — R93.3 ABNORMAL FINDING ON GI TRACT IMAGING: Primary | ICD-10-CM

## 2019-10-28 PROCEDURE — 99214 OFFICE O/P EST MOD 30 MIN: CPT | Performed by: INTERNAL MEDICINE

## 2019-10-28 RX ORDER — HYDROCODONE BITARTRATE AND ACETAMINOPHEN 10; 325 MG/1; MG/1
TABLET ORAL AS NEEDED
Refills: 0 | COMMUNITY
Start: 2019-10-18

## 2019-10-28 RX ORDER — METHIMAZOLE 10 MG/1
10 TABLET ORAL
COMMUNITY
End: 2019-12-02 | Stop reason: DRUGHIGH

## 2019-10-28 RX ORDER — SODIUM CHLORIDE, SODIUM LACTATE, POTASSIUM CHLORIDE, CALCIUM CHLORIDE 600; 310; 30; 20 MG/100ML; MG/100ML; MG/100ML; MG/100ML
30 INJECTION, SOLUTION INTRAVENOUS CONTINUOUS
Status: CANCELLED | OUTPATIENT
Start: 2019-12-06

## 2019-10-28 NOTE — PROGRESS NOTES
Subjective   Megan Ratliff is a 64 y.o.. female is here today for follow-up.    Chief Complaint   Patient presents with   • Abnormal Imaging     CT @ [facility] July 2019   • Difficulty Swallowing   • Weight Loss     Thyroid     History of Present Illness  Patient has a couple of issues.  First is that she is having solid food dysphasia.  Is been going on for about 3 months.  It is not severe but it is new.  She underwent [facility]oscopy [facility] about 18 months ago and she did have grade B esophagitis but no evidence of a stricture.  The second thing is that she had a CT scan of the abdomen performed and it demonstrated some stranding around the cecum or a sending colon that was of unknown significance.  Again, she had a colonoscopy about 18 months ago in that area looked normal at that time.  The third thing is that she developed weight loss and was subsequently diagnosed with hyperthyroidism.  She has been on Tapazole with apparently good results.    The following portions of the patient's history were reviewed and updated as appropriate: allergies, current medications, past family history, past medical history, past social history, past surgical history and problem list.      Current Outpatient Medications:   •  aspirin 81 MG chewable tablet, Chew 81 mg Daily. PT TO CHECK WITH MD FOR STOP DATE, Disp: , Rfl:   •  escitalopram (LEXAPRO) 20 MG tablet, Take 1 tablet by mouth Daily., Disp: 90 tablet, Rfl: 1  •  HYDROcodone-acetaminophen (NORCO)  MG per tablet, TAKE 1 TABLET BY MOUTH 5 TIMES DAILY, Disp: , Rfl: 0  •  methIMAzole (TAPAZOLE) 10 MG tablet, Take 10 mg by mouth., Disp: , Rfl:     Family History   Problem Relation Age of Onset   • Colon cancer Mother    • Breast cancer Mother         in her 60's   • Arthritis Mother    • Cancer Mother    • Heart disease Mother    • Hypertension Mother    • Obesity Mother    • Diabetes Mother    • COPD Mother    • Hyperlipidemia Mother    • Colon polyps Mother    • Arthritis  Father    • Heart disease Father    • Hypertension Father    • COPD Father    • Hyperlipidemia Father    • Breast cancer Maternal Grandmother    • Lymphoma Maternal Grandmother    • Cancer Maternal Grandmother    • Breast cancer Maternal Aunt    • Early death Brother    • Heart disease Maternal Grandfather    • Hypertension Maternal Grandfather    • Hypertension Brother    • Obesity Brother    • Malig Hyperthermia Neg Hx        Review of Systems   Respiratory: Negative for shortness of breath.    Cardiovascular: Negative for chest pain.   All other systems reviewed and are negative.      Objective   Physical Exam   Constitutional: She is oriented to person, place, and time. She appears well-developed and well-nourished.   HENT:   Head: Normocephalic and atraumatic.   Right Ear: External ear normal.   Left Ear: External ear normal.   Eyes: Conjunctivae and EOM are normal. Pupils are equal, round, and reactive to light.   Pulmonary/Chest: Effort normal.   Neurological: She is alert and oriented to person, place, and time.   Psychiatric: She has a normal mood and affect. Her behavior is normal. Judgment and thought content normal.   Nursing note and vitals reviewed.      Pertinent laboratory results were reviewed. , Pertinent old records were reviewed. , Pertinent radiology results were reviewed.  and Pertinent medical tests were reviewed.     Assessment/Plan   Problems Addressed this Visit        Digestive    Esophageal dysphagia    Relevant Orders    Case Request (Completed)    PONV (postoperative nausea and vomiting)    Relevant Orders    Case Request (Completed)       Other    Abnormal finding on GI tract imaging - Primary    Relevant Orders    Case Request (Completed)        These are indications for EGD and colonoscopy and we will go ahead and schedule those.  I will need clearance from her endocrinologist (Dr Maldonado) and I have sent him a note asking if it would be appropriate to proceed with the studies and if  not, when it would be safe to do so.

## 2019-10-29 ENCOUNTER — RESULTS ENCOUNTER (OUTPATIENT)
Dept: ENDOCRINOLOGY | Age: 64
End: 2019-10-29

## 2019-10-29 DIAGNOSIS — E05.90 HYPERTHYROIDISM: ICD-10-CM

## 2019-10-29 DIAGNOSIS — I10 HYPERTENSION, UNSPECIFIED TYPE: ICD-10-CM

## 2019-10-29 LAB
ALBUMIN SERPL-MCNC: 4 G/DL (ref 3.5–5.2)
ALBUMIN/GLOB SERPL: 1.8 G/DL
ALP SERPL-CCNC: 140 U/L (ref 39–117)
ALT SERPL-CCNC: 14 U/L (ref 1–33)
AST SERPL-CCNC: 19 U/L (ref 1–32)
BILIRUB SERPL-MCNC: 0.3 MG/DL (ref 0.2–1.2)
BUN SERPL-MCNC: 10 MG/DL (ref 8–23)
BUN/CREAT SERPL: 19.6 (ref 7–25)
CALCIUM SERPL-MCNC: 9 MG/DL (ref 8.6–10.5)
CHLORIDE SERPL-SCNC: 102 MMOL/L (ref 98–107)
CO2 SERPL-SCNC: 28.1 MMOL/L (ref 22–29)
CREAT SERPL-MCNC: 0.51 MG/DL (ref 0.57–1)
GLOBULIN SER CALC-MCNC: 2.2 GM/DL
GLUCOSE SERPL-MCNC: 102 MG/DL (ref 65–99)
POTASSIUM SERPL-SCNC: 5.1 MMOL/L (ref 3.5–5.2)
PROT SERPL-MCNC: 6.2 G/DL (ref 6–8.5)
SODIUM SERPL-SCNC: 141 MMOL/L (ref 136–145)
T4 FREE SERPL-MCNC: 1.96 NG/DL (ref 0.93–1.7)
TSH SERPL DL<=0.005 MIU/L-ACNC: <0.005 UIU/ML (ref 0.27–4.2)

## 2019-10-30 ENCOUNTER — TELEPHONE (OUTPATIENT)
Dept: GASTROENTEROLOGY | Facility: CLINIC | Age: 64
End: 2019-10-30

## 2019-10-30 NOTE — TELEPHONE ENCOUNTER
Called patient let her know what DR. Ureña said. Called scheduling spoke to Lindsay put her in DEPOT.

## 2019-10-30 NOTE — TELEPHONE ENCOUNTER
----- Message from Claudy Ureña MD sent at 10/30/2019  1:56 PM EDT -----  We should cancel her procedures until she receives clearance from Dr Maldonado.   ----- Message -----  From: Catrachito Maldonado MD  Sent: 10/28/2019   4:32 PM  To: Claudy Ureña MD    She has hyperthyroidism most likely due to Graves' disease and was started on Tapazole 20 mg/day on September 11, 2019.  She is supposed to have follow-up thyroid function test 6 weeks after but has not done so yet.  Ideally, you should wait for her to be euthyroid to do elective procedures to reduce chances for thyroid storm.    ----- Message -----  From: Claudy Ureña MD  Sent: 10/28/2019   2:08 PM  To: Catrachito Maldonado MD    You are seeing patient for hyperthyroidism. I would like to proceed with EGD and colonoscopy in the near future. Is it safe to do so?

## 2019-11-01 DIAGNOSIS — E05.90 HYPERTHYROIDISM: Primary | ICD-10-CM

## 2019-11-21 NOTE — PROGRESS NOTES
Subjective   Megan Ratliff is a 64 y.o. female.     F/u for hyperthyroidism, hx of hep Cm hepatic steatosis / flu vaccine declined       Patient is a 64-year-old female who came in for follow-up.    In 2019,  she was seen by Dr. Young because of a 40 pound weight loss since 2019.  Work-up done in 2019 showed an elevated free T4 at 1.95 ng per DL, elevated thyroid-stimulating immunoglobulin at 4.13 international units/L and elevated thyroid peroxidase antibody at 288 international units/mL.  She was started on Tapazole 10 mg 2 tablets/day which she was not taking regularly until 3 weeks ago.  Thyroid ultrasound done in 2019 showed slightly heterogeneous diffuse echotexture and multiple subcentimeter sized solid thyroid nodules bilaterally without dominant or grossly suspicious nodule.    She has lost 4 pounds since 2019.  She denies heat or cold intolerance.  She has occasional constipation.  She denies palpitations or tremors.    She was given IV contrast agent for CT scan of the abdomen and pelvis on 2019.  CT scan shows small ventral hernia and diffuse hepatic steatosis.    She was seen by Dr. Ureña on 2019 for solid food dysphagia and stranding around the cecum and ascending colon on CT scan.  He is planning to do an EGD and colonoscopy on her in the near future.    She had an epigastric hernia repair done by Dr. Dong on 2019 without complications.    She is  1 para 2.  She had a hysterectomy with one oophorectomy at age 34 for endometriosis.  She was never on hormone replacement therapy.  Bone density done at [facility] in 2017 showed osteopenia.  She was prescribed an unknown monthly oral medication but did not get it filled because of cost.    She has not had a pneumococcal, flu vaccine, or shingles vaccine.    The following portions of the patient's history were reviewed and updated as appropriate: allergies, current  "medications, past family history, past medical history, past social history, past surgical history and problem list.    Review of Systems   Constitutional: Negative for fatigue.   HENT: Negative.    Eyes: Negative.    Respiratory: Negative.    Cardiovascular: Negative.    Gastrointestinal: Positive for constipation. Negative for anal bleeding, blood in stool and diarrhea.   Endocrine: Negative for cold intolerance, heat intolerance, polydipsia and polyuria.   Genitourinary: Negative.    Musculoskeletal: Negative.    Neurological: Negative for dizziness, weakness and numbness.   Hematological: Does not bruise/bleed easily.     Objective      Vitals:    11/22/19 0920   BP: 122/82   BP Location: Right arm   Patient Position: Sitting   Cuff Size: Small Adult   Pulse: 59   SpO2: 98%   Weight: 67.1 kg (148 lb)   Height: 170.2 cm (67.01\")     Physical Exam   Constitutional: She is oriented to person, place, and time. She appears well-developed and well-nourished. No distress.   HENT:   Head: Normocephalic.   Right Ear: External ear normal.   Left Ear: External ear normal.   Nose: Nose normal.   Mouth/Throat: Oropharynx is clear and moist. No oropharyngeal exudate.   Eyes: Conjunctivae and EOM are normal. Right eye exhibits no discharge. Left eye exhibits no discharge. No scleral icterus.   No exophthalmos or lid lag.   Neck: Normal range of motion. No tracheal deviation present. No thyromegaly present.   Cardiovascular: Normal rate, regular rhythm, normal heart sounds and intact distal pulses. Exam reveals no gallop and no friction rub.   No murmur heard.  Pulmonary/Chest: Effort normal and breath sounds normal. No stridor. No respiratory distress. She has no wheezes. She has no rales.   Abdominal: Soft. Bowel sounds are normal. She exhibits no distension and no mass. There is no tenderness. There is no rebound and no guarding.   Musculoskeletal: Normal range of motion. She exhibits no edema, tenderness or deformity. "   Lymphadenopathy:     She has no cervical adenopathy.   Neurological: She is alert and oriented to person, place, and time. She displays normal reflexes.   Skin: Skin is warm and dry. She is not diaphoretic. No erythema. No pallor.   Psychiatric: She has a normal mood and affect. Her behavior is normal.     Results Encounter on 10/29/2019   Component Date Value Ref Range Status   • Free T4 10/28/2019 1.96* 0.93 - 1.70 ng/dL Final   • TSH 10/28/2019 <0.005* 0.270 - 4.200 uIU/mL Final   • Glucose 10/28/2019 102* 65 - 99 mg/dL Final   • BUN 10/28/2019 10  8 - 23 mg/dL Final   • Creatinine 10/28/2019 0.51* 0.57 - 1.00 mg/dL Final   • eGFR Non African Am 10/28/2019 122  >60 mL/min/1.73 Final   • eGFR African Am 10/28/2019 148  >60 mL/min/1.73 Final   • BUN/Creatinine Ratio 10/28/2019 19.6  7.0 - 25.0 Final   • Sodium 10/28/2019 141  136 - 145 mmol/L Final   • Potassium 10/28/2019 5.1  3.5 - 5.2 mmol/L Final   • Chloride 10/28/2019 102  98 - 107 mmol/L Final   • Total CO2 10/28/2019 28.1  22.0 - 29.0 mmol/L Final   • Calcium 10/28/2019 9.0  8.6 - 10.5 mg/dL Final   • Total Protein 10/28/2019 6.2  6.0 - 8.5 g/dL Final   • Albumin 10/28/2019 4.00  3.50 - 5.20 g/dL Final   • Globulin 10/28/2019 2.2  gm/dL Final   • A/G Ratio 10/28/2019 1.8  g/dL Final   • Total Bilirubin 10/28/2019 0.3  0.2 - 1.2 mg/dL Final   • Alkaline Phosphatase 10/28/2019 140* 39 - 117 U/L Final   • AST (SGOT) 10/28/2019 19  1 - 32 U/L Final    Specimen hemolyzed.  Results may be affected.   • ALT (SGPT) 10/28/2019 14  1 - 33 U/L Final     Assessment/Plan   Megan was seen today for hyperthyroidism and hepatitis c.    Diagnoses and all orders for this visit:    Hyperthyroidism  -     Comprehensive Metabolic Panel  -     CBC & Differential  -     TSH  -     T4, Free  -     T3, Free  -     Vitamin D 25 Hydroxy    History of hepatitis C  -     Comprehensive Metabolic Panel    Hepatic steatosis  -     Comprehensive Metabolic Panel    Osteopenia of multiple  sites  -     Comprehensive Metabolic Panel  -     Vitamin D 25 Hydroxy  -     DEXA Bone Density Axial      Continue Tapazole 20 mg/day.  Check thyroid function tests today.  She has osteopenia on bone density done in 2017.  Schedule follow-up bone density.  Check vitamin D levels.  Advised to discuss with Dr. Marley about getting shingles vaccine, flu vaccine, and Pneumovax    Copy of my note sent to Dr. Young , Dr. Ureña and Dr. Stiles.    RTC 4 mos

## 2019-11-22 ENCOUNTER — OFFICE VISIT (OUTPATIENT)
Dept: ENDOCRINOLOGY | Age: 64
End: 2019-11-22

## 2019-11-22 VITALS
OXYGEN SATURATION: 98 % | BODY MASS INDEX: 23.23 KG/M2 | HEART RATE: 59 BPM | DIASTOLIC BLOOD PRESSURE: 82 MMHG | HEIGHT: 67 IN | SYSTOLIC BLOOD PRESSURE: 122 MMHG | WEIGHT: 148 LBS

## 2019-11-22 DIAGNOSIS — M85.89 OSTEOPENIA OF MULTIPLE SITES: ICD-10-CM

## 2019-11-22 DIAGNOSIS — E05.90 HYPERTHYROIDISM: Primary | ICD-10-CM

## 2019-11-22 DIAGNOSIS — Z86.19 HISTORY OF HEPATITIS C: ICD-10-CM

## 2019-11-22 DIAGNOSIS — K76.0 HEPATIC STEATOSIS: ICD-10-CM

## 2019-11-22 PROCEDURE — 99214 OFFICE O/P EST MOD 30 MIN: CPT | Performed by: INTERNAL MEDICINE

## 2019-11-23 LAB
25(OH)D3+25(OH)D2 SERPL-MCNC: 26.9 NG/ML (ref 30–100)
ALBUMIN SERPL-MCNC: 4.2 G/DL (ref 3.5–5.2)
ALBUMIN/GLOB SERPL: 1.6 G/DL
ALP SERPL-CCNC: 137 U/L (ref 39–117)
ALT SERPL-CCNC: 10 U/L (ref 1–33)
AST SERPL-CCNC: 13 U/L (ref 1–32)
BASOPHILS # BLD AUTO: 0.03 10*3/MM3 (ref 0–0.2)
BASOPHILS NFR BLD AUTO: 0.6 % (ref 0–1.5)
BILIRUB SERPL-MCNC: 0.3 MG/DL (ref 0.2–1.2)
BUN SERPL-MCNC: 9 MG/DL (ref 8–23)
BUN/CREAT SERPL: 13.6 (ref 7–25)
CALCIUM SERPL-MCNC: 9 MG/DL (ref 8.6–10.5)
CHLORIDE SERPL-SCNC: 103 MMOL/L (ref 98–107)
CO2 SERPL-SCNC: 27.4 MMOL/L (ref 22–29)
CREAT SERPL-MCNC: 0.66 MG/DL (ref 0.57–1)
EOSINOPHIL # BLD AUTO: 0.09 10*3/MM3 (ref 0–0.4)
EOSINOPHIL NFR BLD AUTO: 1.7 % (ref 0.3–6.2)
ERYTHROCYTE [DISTWIDTH] IN BLOOD BY AUTOMATED COUNT: 14.2 % (ref 12.3–15.4)
GLOBULIN SER CALC-MCNC: 2.6 GM/DL
GLUCOSE SERPL-MCNC: 97 MG/DL (ref 65–99)
HCT VFR BLD AUTO: 39.9 % (ref 34–46.6)
HGB BLD-MCNC: 13 G/DL (ref 12–15.9)
IMM GRANULOCYTES # BLD AUTO: 0 10*3/MM3 (ref 0–0.05)
IMM GRANULOCYTES NFR BLD AUTO: 0 % (ref 0–0.5)
LYMPHOCYTES # BLD AUTO: 2.25 10*3/MM3 (ref 0.7–3.1)
LYMPHOCYTES NFR BLD AUTO: 43.1 % (ref 19.6–45.3)
MCH RBC QN AUTO: 28.3 PG (ref 26.6–33)
MCHC RBC AUTO-ENTMCNC: 32.6 G/DL (ref 31.5–35.7)
MCV RBC AUTO: 86.9 FL (ref 79–97)
MONOCYTES # BLD AUTO: 0.44 10*3/MM3 (ref 0.1–0.9)
MONOCYTES NFR BLD AUTO: 8.4 % (ref 5–12)
NEUTROPHILS # BLD AUTO: 2.41 10*3/MM3 (ref 1.7–7)
NEUTROPHILS NFR BLD AUTO: 46.2 % (ref 42.7–76)
NRBC BLD AUTO-RTO: 0 /100 WBC (ref 0–0.2)
PLATELET # BLD AUTO: 363 10*3/MM3 (ref 140–450)
POTASSIUM SERPL-SCNC: 4.4 MMOL/L (ref 3.5–5.2)
PROT SERPL-MCNC: 6.8 G/DL (ref 6–8.5)
RBC # BLD AUTO: 4.59 10*6/MM3 (ref 3.77–5.28)
SODIUM SERPL-SCNC: 142 MMOL/L (ref 136–145)
T3FREE SERPL-MCNC: 2.1 PG/ML (ref 2–4.4)
T4 FREE SERPL-MCNC: 0.66 NG/DL (ref 0.93–1.7)
TSH SERPL DL<=0.005 MIU/L-ACNC: 0.01 UIU/ML (ref 0.27–4.2)
WBC # BLD AUTO: 5.22 10*3/MM3 (ref 3.4–10.8)

## 2019-11-27 DIAGNOSIS — N95.1 SYMPTOMATIC MENOPAUSAL OR FEMALE CLIMACTERIC STATES: Primary | ICD-10-CM

## 2019-12-02 DIAGNOSIS — E05.90 HYPERTHYROIDISM: Primary | ICD-10-CM

## 2019-12-02 RX ORDER — MULTIVIT-MIN/IRON/FOLIC ACID/K 18-600-40
1000 CAPSULE ORAL DAILY
Qty: 60 TABLET
Start: 2019-12-02

## 2019-12-02 RX ORDER — METHIMAZOLE 10 MG/1
10 TABLET ORAL DAILY
Start: 2019-12-02 | End: 2019-12-18

## 2019-12-03 ENCOUNTER — TELEPHONE (OUTPATIENT)
Dept: GASTROENTEROLOGY | Facility: CLINIC | Age: 64
End: 2019-12-03

## 2019-12-03 NOTE — TELEPHONE ENCOUNTER
Left message for patient to call [phone]. Need to reschedule her EGD and colonoscopy. Have clearance for her to do procedures.

## 2019-12-16 ENCOUNTER — RESULTS ENCOUNTER (OUTPATIENT)
Dept: ENDOCRINOLOGY | Age: 64
End: 2019-12-16

## 2019-12-16 DIAGNOSIS — E05.90 HYPERTHYROIDISM: ICD-10-CM

## 2019-12-17 ENCOUNTER — TELEPHONE (OUTPATIENT)
Dept: GASTROENTEROLOGY | Facility: CLINIC | Age: 64
End: 2019-12-17

## 2019-12-17 DIAGNOSIS — M85.89 OSTEOPENIA OF MULTIPLE SITES: Primary | ICD-10-CM

## 2019-12-17 DIAGNOSIS — N95.1 SYMPTOMATIC MENOPAUSAL OR FEMALE CLIMACTERIC STATES: ICD-10-CM

## 2019-12-17 LAB
T3FREE SERPL-MCNC: 1.8 PG/ML (ref 2–4.4)
T4 FREE SERPL-MCNC: 0.55 NG/DL (ref 0.93–1.7)
TSH SERPL DL<=0.005 MIU/L-ACNC: 0.71 UIU/ML (ref 0.27–4.2)

## 2019-12-17 RX ORDER — ZOLEDRONIC ACID 5 MG/100ML
INJECTION, SOLUTION INTRAVENOUS ONCE
Status: CANCELLED | OUTPATIENT
Start: 2019-12-17 | End: 2019-12-17

## 2019-12-17 NOTE — TELEPHONE ENCOUNTER
----- Message from Claudy Ureña MD sent at 12/2/2019  9:57 AM EST -----  We now have permission to go ahead with the scope tests.   ----- Message -----  From: Catrachito Maldonado MD  Sent: 12/1/2019   5:27 PM  To: Claudy Ureña MD, Carlos Young Jr., MD, #    Free T3 normal.  Free T4 low.  Decrease Tapazole 10 mg to 1 tablet/day.  Repeat free T4, free T3, and TSH in 2 weeks.  Normal CBC and CMP.  Patient may be scheduled for colonoscopy anytime.  Ask patient to call Dr. Ureña for scheduling of colonoscopy  Vitamin D insufficient.  Start vitamin D3 1000 units/day.  Copy of labs sent to Dr. Ureña and Dr. Young  Please notify patient of results

## 2019-12-18 DIAGNOSIS — E05.90 HYPERTHYROIDISM: Primary | ICD-10-CM

## 2019-12-18 LAB
ALBUMIN SERPL-MCNC: 4 G/DL (ref 3.5–5.2)
ALBUMIN/GLOB SERPL: 1.6 G/DL
ALP SERPL-CCNC: 161 U/L (ref 39–117)
ALT SERPL-CCNC: 14 U/L (ref 1–33)
AST SERPL-CCNC: 16 U/L (ref 1–32)
BILIRUB SERPL-MCNC: 0.2 MG/DL (ref 0.2–1.2)
BUN SERPL-MCNC: 10 MG/DL (ref 8–23)
BUN/CREAT SERPL: 13.7 (ref 7–25)
CALCIUM SERPL-MCNC: 8.5 MG/DL (ref 8.6–10.5)
CHLORIDE SERPL-SCNC: 103 MMOL/L (ref 98–107)
CO2 SERPL-SCNC: 26.8 MMOL/L (ref 22–29)
CREAT SERPL-MCNC: 0.73 MG/DL (ref 0.57–1)
GLOBULIN SER CALC-MCNC: 2.5 GM/DL
GLUCOSE SERPL-MCNC: 91 MG/DL (ref 65–99)
Lab: NORMAL
POTASSIUM SERPL-SCNC: 4.8 MMOL/L (ref 3.5–5.2)
PROT SERPL-MCNC: 6.5 G/DL (ref 6–8.5)
SODIUM SERPL-SCNC: 144 MMOL/L (ref 136–145)
WRITTEN AUTHORIZATION: NORMAL

## 2019-12-26 ENCOUNTER — TELEPHONE (OUTPATIENT)
Dept: GASTROENTEROLOGY | Facility: CLINIC | Age: 64
End: 2019-12-26

## 2019-12-26 NOTE — TELEPHONE ENCOUNTER
Returned patients call left message. She can take calcium. She will not take it day of EGD until after EGD.

## 2019-12-31 ENCOUNTER — HOSPITAL ENCOUNTER (OUTPATIENT)
Facility: HOSPITAL | Age: 64
Setting detail: HOSPITAL OUTPATIENT SURGERY
Discharge: HOME | End: 2019-12-31
Attending: INTERNAL MEDICINE | Admitting: INTERNAL MEDICINE

## 2019-12-31 ENCOUNTER — ANESTHESIA (OUTPATIENT)
Dept: GASTROENTEROLOGY | Facility: HOSPITAL | Age: 64
End: 2019-12-31

## 2019-12-31 ENCOUNTER — ANESTHESIA EVENT (OUTPATIENT)
Dept: GASTROENTEROLOGY | Facility: HOSPITAL | Age: 64
End: 2019-12-31

## 2019-12-31 VITALS
WEIGHT: 147.2 LBS | HEIGHT: 67 IN | SYSTOLIC BLOOD PRESSURE: 120 MMHG | HEART RATE: 67 BPM | BODY MASS INDEX: 23.1 KG/M2 | TEMPERATURE: 98.6 F | OXYGEN SATURATION: 100 % | RESPIRATION RATE: 16 BRPM | DIASTOLIC BLOOD PRESSURE: 105 MMHG

## 2019-12-31 DIAGNOSIS — Z98.890 PONV (POSTOPERATIVE NAUSEA AND VOMITING): ICD-10-CM

## 2019-12-31 DIAGNOSIS — R93.3 ABNORMAL FINDING ON GI TRACT IMAGING: ICD-10-CM

## 2019-12-31 DIAGNOSIS — R13.19 ESOPHAGEAL DYSPHAGIA: ICD-10-CM

## 2019-12-31 DIAGNOSIS — R11.2 PONV (POSTOPERATIVE NAUSEA AND VOMITING): ICD-10-CM

## 2019-12-31 PROCEDURE — 88305 TISSUE EXAM BY PATHOLOGIST: CPT | Performed by: INTERNAL MEDICINE

## 2019-12-31 PROCEDURE — 45378 DIAGNOSTIC COLONOSCOPY: CPT | Performed by: INTERNAL MEDICINE

## 2019-12-31 PROCEDURE — 43239 EGD BIOPSY SINGLE/MULTIPLE: CPT | Performed by: INTERNAL MEDICINE

## 2019-12-31 PROCEDURE — 43450 DILATE ESOPHAGUS 1/MULT PASS: CPT | Performed by: INTERNAL MEDICINE

## 2019-12-31 PROCEDURE — 25010000002 PROPOFOL 10 MG/ML EMULSION: Performed by: NURSE ANESTHETIST, CERTIFIED REGISTERED

## 2019-12-31 RX ORDER — SODIUM CHLORIDE 0.9 % (FLUSH) 0.9 %
10 SYRINGE (ML) INJECTION AS NEEDED
Status: DISCONTINUED | OUTPATIENT
Start: 2019-12-31 | End: 2019-12-31 | Stop reason: HOSPADM

## 2019-12-31 RX ORDER — LIDOCAINE HYDROCHLORIDE 10 MG/ML
0.5 INJECTION, SOLUTION INFILTRATION; PERINEURAL ONCE AS NEEDED
Status: DISCONTINUED | OUTPATIENT
Start: 2019-12-31 | End: 2019-12-31 | Stop reason: HOSPADM

## 2019-12-31 RX ORDER — GLYCOPYRROLATE 0.2 MG/ML
INJECTION INTRAMUSCULAR; INTRAVENOUS AS NEEDED
Status: DISCONTINUED | OUTPATIENT
Start: 2019-12-31 | End: 2019-12-31 | Stop reason: SURG

## 2019-12-31 RX ORDER — LIDOCAINE HYDROCHLORIDE 20 MG/ML
INJECTION, SOLUTION INFILTRATION; PERINEURAL AS NEEDED
Status: DISCONTINUED | OUTPATIENT
Start: 2019-12-31 | End: 2019-12-31 | Stop reason: SURG

## 2019-12-31 RX ORDER — PROPOFOL 10 MG/ML
VIAL (ML) INTRAVENOUS CONTINUOUS PRN
Status: DISCONTINUED | OUTPATIENT
Start: 2019-12-31 | End: 2019-12-31 | Stop reason: SURG

## 2019-12-31 RX ORDER — SODIUM CHLORIDE, SODIUM LACTATE, POTASSIUM CHLORIDE, CALCIUM CHLORIDE 600; 310; 30; 20 MG/100ML; MG/100ML; MG/100ML; MG/100ML
30 INJECTION, SOLUTION INTRAVENOUS CONTINUOUS
Status: DISCONTINUED | OUTPATIENT
Start: 2019-12-31 | End: 2019-12-31 | Stop reason: HOSPADM

## 2019-12-31 RX ADMIN — GLYCOPYRROLATE 0.2 MCG: 0.2 INJECTION INTRAMUSCULAR; INTRAVENOUS at 11:06

## 2019-12-31 RX ADMIN — PROPOFOL 180 MCG/KG/MIN: 10 INJECTION, EMULSION INTRAVENOUS at 11:14

## 2019-12-31 RX ADMIN — SODIUM CHLORIDE, POTASSIUM CHLORIDE, SODIUM LACTATE AND CALCIUM CHLORIDE 30 ML/HR: 600; 310; 30; 20 INJECTION, SOLUTION INTRAVENOUS at 11:00

## 2019-12-31 RX ADMIN — LIDOCAINE HYDROCHLORIDE 60 MG: 20 INJECTION, SOLUTION INFILTRATION; PERINEURAL at 11:14

## 2019-12-31 NOTE — ANESTHESIA POSTPROCEDURE EVALUATION
"Patient: Megan Ratliff    Procedure Summary     Date:  12/31/19 Room / [facility]    Anesthesia Start:  1105 Anesthesia Stop:  1140    Procedures:       ESOPHAGOGASTRODUODENOSCOPY with Bx's, Leahy Dilation with #48, #52 (N/A Esophagus)      COLONOSCOPY to Cecum (N/A ) Diagnosis:       Abnormal finding on GI tract imaging      Esophageal dysphagia      PONV (postoperative nausea and vomiting)      (Abnormal finding on GI tract imaging [R93.3])      (Esophageal dysphagia [R13.10])      (PONV (postoperative nausea and vomiting) [R11.2, Z98.890])    Surgeon:  Claudy Ureña MD Provider:  Tanner Tony MD    Anesthesia Type:  MAC ASA Status:  2          Anesthesia Type: MAC    Vitals  Vitals Value Taken Time   /105 12/31/2019 12:05 PM   Temp     Pulse 67 12/31/2019 12:05 PM   Resp 16 12/31/2019 12:05 PM   SpO2 100 % 12/31/2019 12:05 PM           Post Anesthesia Care and Evaluation    Patient location during evaluation: PACU  Patient participation: complete - patient participated  Level of consciousness: awake  Pain score: 0  Pain management: adequate  Airway patency: patent  Anesthetic complications: No anesthetic complications  PONV Status: none  Cardiovascular status: acceptable  Respiratory status: acceptable  Hydration status: acceptable    Comments: BP (!) 120/105   Pulse 67   Temp 37 °C (98.6 °F) (Oral)   Resp 16   Ht 170.2 cm (67\")   Wt 66.8 kg (147 lb 3.2 oz)   SpO2 100%   BMI 23.05 kg/m²       "

## 2019-12-31 NOTE — ANESTHESIA PREPROCEDURE EVALUATION
Anesthesia Evaluation     Patient summary reviewed and Nursing notes reviewed   history of anesthetic complications:               Airway   Mallampati: I  TM distance: >3 FB  Neck ROM: full  No difficulty expected  Dental - normal exam     Pulmonary - normal exam   (+) a smoker,   Cardiovascular - normal exam    (+) hypertension,       Neuro/Psych  (+) numbness, psychiatric history Anxiety,     GI/Hepatic/Renal/Endo    (+)   hepatitis, liver disease,     Musculoskeletal     Abdominal  - normal exam    Bowel sounds: normal.   Substance History - negative use     OB/GYN negative ob/gyn ROS         Other   arthritis,    history of cancer                    Anesthesia Plan    ASA 2     MAC       Anesthetic plan, all risks, benefits, and alternatives have been provided, discussed and informed consent has been obtained with: patient.

## 2020-01-02 LAB
LAB AP CASE REPORT: NORMAL
PATH REPORT.FINAL DX SPEC: NORMAL
PATH REPORT.GROSS SPEC: NORMAL

## 2020-01-06 ENCOUNTER — RESULTS ENCOUNTER (OUTPATIENT)
Dept: ENDOCRINOLOGY | Age: 65
End: 2020-01-06

## 2020-01-06 DIAGNOSIS — E05.90 HYPERTHYROIDISM: ICD-10-CM

## 2020-01-08 ENCOUNTER — APPOINTMENT (OUTPATIENT)
Dept: INFUSION THERAPY | Facility: HOSPITAL | Age: 65
End: 2020-01-08

## 2020-01-08 LAB
ALBUMIN SERPL-MCNC: 4.1 G/DL (ref 3.5–5.2)
ALBUMIN/GLOB SERPL: 1.5 G/DL
ALP SERPL-CCNC: 152 U/L (ref 39–117)
ALT SERPL-CCNC: 13 U/L (ref 1–33)
AST SERPL-CCNC: 15 U/L (ref 1–32)
BILIRUB SERPL-MCNC: 0.4 MG/DL (ref 0.2–1.2)
BUN SERPL-MCNC: 9 MG/DL (ref 8–23)
BUN/CREAT SERPL: 12.5 (ref 7–25)
CALCIUM SERPL-MCNC: 8.8 MG/DL (ref 8.6–10.5)
CHLORIDE SERPL-SCNC: 99 MMOL/L (ref 98–107)
CO2 SERPL-SCNC: 30.2 MMOL/L (ref 22–29)
CREAT SERPL-MCNC: 0.72 MG/DL (ref 0.57–1)
GLOBULIN SER CALC-MCNC: 2.7 GM/DL
GLUCOSE SERPL-MCNC: 107 MG/DL (ref 65–99)
POTASSIUM SERPL-SCNC: 4 MMOL/L (ref 3.5–5.2)
PROT SERPL-MCNC: 6.8 G/DL (ref 6–8.5)
SODIUM SERPL-SCNC: 140 MMOL/L (ref 136–145)
T3FREE SERPL-MCNC: 6.1 PG/ML (ref 2–4.4)
T4 FREE SERPL-MCNC: 1.82 NG/DL (ref 0.93–1.7)
TSH SERPL DL<=0.005 MIU/L-ACNC: <0.005 UIU/ML (ref 0.27–4.2)

## 2020-01-09 ENCOUNTER — HOSPITAL ENCOUNTER (OUTPATIENT)
Dept: INFUSION THERAPY | Facility: HOSPITAL | Age: 65
Discharge: HOME | End: 2020-01-09
Admitting: INTERNAL MEDICINE

## 2020-01-09 VITALS
OXYGEN SATURATION: 96 % | BODY MASS INDEX: 23.43 KG/M2 | WEIGHT: 149.6 LBS | DIASTOLIC BLOOD PRESSURE: 67 MMHG | SYSTOLIC BLOOD PRESSURE: 103 MMHG | TEMPERATURE: 98.3 F | HEART RATE: 80 BPM | RESPIRATION RATE: 20 BRPM

## 2020-01-09 DIAGNOSIS — N95.1 SYMPTOMATIC MENOPAUSAL OR FEMALE CLIMACTERIC STATES: Primary | ICD-10-CM

## 2020-01-09 DIAGNOSIS — M85.89 OSTEOPENIA OF MULTIPLE SITES: ICD-10-CM

## 2020-01-09 PROCEDURE — 96365 THER/PROPH/DIAG IV INF INIT: CPT

## 2020-01-09 PROCEDURE — 25010000002 ZOLEDRONIC ACID 5 MG/100ML SOLUTION: Performed by: INTERNAL MEDICINE

## 2020-01-09 RX ORDER — ZOLEDRONIC ACID 5 MG/100ML
5 INJECTION, SOLUTION INTRAVENOUS ONCE
Status: CANCELLED | OUTPATIENT
Start: 2021-01-08

## 2020-01-09 RX ORDER — ZOLEDRONIC ACID 5 MG/100ML
5 INJECTION, SOLUTION INTRAVENOUS ONCE
Status: COMPLETED | OUTPATIENT
Start: 2020-01-09 | End: 2020-01-09

## 2020-01-09 RX ADMIN — ZOLEDRONIC ACID 5 MG: 5 INJECTION, SOLUTION INTRAVENOUS at 13:39

## 2020-01-09 NOTE — PATIENT INSTRUCTIONS
Zoledronic Acid injection (Paget's Disease, Osteoporosis)  What is this medicine?  ZOLEDRONIC ACID (ESPERANZA kushal dasilva ik AS id) lowers the amount of calcium loss from bone. It is used to treat Paget's disease and osteoporosis in women.  This medicine may be used for other purposes; ask your health care provider or pharmacist if you have questions.  COMMON BRAND NAME(S): Reclast, Zometa  What should I tell my health care provider before I take this medicine?  They need to know if you have any of these conditions:  -aspirin-sensitive asthma  -cancer, especially if you are receiving medicines used to treat cancer  -dental disease or wear dentures  -infection  -kidney disease  -low levels of calcium in the blood  -past surgery on the parathyroid gland or intestines  -receiving corticosteroids like dexamethasone or prednisone  -an unusual or allergic reaction to zoledronic acid, other medicines, foods, dyes, or preservatives  -pregnant or trying to get pregnant  -breast-feeding  How should I use this medicine?  This medicine is for infusion into a vein. It is given by a health care professional in a hospital or clinic setting.  Talk to your pediatrician regarding the use of this medicine in children. This medicine is not approved for use in children.  Overdosage: If you think you have taken too much of this medicine contact a poison control center or emergency room at once.  NOTE: This medicine is only for you. Do not share this medicine with others.  What if I miss a dose?  It is important not to miss your dose. Call your doctor or health care professional if you are unable to keep an appointment.  What may interact with this medicine?  -certain antibiotics given by injection  -NSAIDs, medicines for pain and inflammation, like ibuprofen or naproxen  -some diuretics like bumetanide, furosemide  -teriparatide  This list may not describe all possible interactions. Give your health care provider a list of all the medicines,  herbs, non-prescription drugs, or dietary supplements you use. Also tell them if you smoke, drink alcohol, or use illegal drugs. Some items may interact with your medicine.  What should I watch for while using this medicine?  Visit your doctor or health care professional for regular checkups. It may be some time before you see the benefit from this medicine. Do not stop taking your medicine unless your doctor tells you to. Your doctor may order blood tests or other tests to see how you are doing.  Women should inform their doctor if they wish to become pregnant or think they might be pregnant. There is a potential for serious side effects to an unborn child. Talk to your health care professional or pharmacist for more information.  You should make sure that you get enough calcium and vitamin D while you are taking this medicine. Discuss the foods you eat and the vitamins you take with your health care professional.  Some people who take this medicine have severe bone, joint, and/or muscle pain. This medicine may also increase your risk for jaw problems or a broken thigh bone. Tell your doctor right away if you have severe pain in your jaw, bones, joints, or muscles. Tell your doctor if you have any pain that does not go away or that gets worse.  Tell your dentist and dental surgeon that you are taking this medicine. You should not have major dental surgery while on this medicine. See your dentist to have a dental exam and fix any dental problems before starting this medicine. Take good care of your teeth while on this medicine. Make sure you see your dentist for regular follow-up appointments.  What side effects may I notice from receiving this medicine?  Side effects that you should report to your doctor or health care professional as soon as possible:  -allergic reactions like skin rash, itching or hives, swelling of the face, lips, or tongue  -anxiety, confusion, or depression  -breathing problems  -changes in  vision  -eye pain  -feeling faint or lightheaded, falls  -jaw pain, especially after dental work  -mouth sores  -muscle cramps, stiffness, or weakness  -redness, blistering, peeling or loosening of the skin, including inside the mouth  -trouble passing urine or change in the amount of urine  Side effects that usually do not require medical attention (report to your doctor or health care professional if they continue or are bothersome):  -bone, joint, or muscle pain  -constipation  -diarrhea  -fever  -hair loss  -irritation at site where injected  -loss of appetite  -nausea, vomiting  -stomach upset  -trouble sleeping  -trouble swallowing  -weak or tired  This list may not describe all possible side effects. Call your doctor for medical advice about side effects. You may report side effects to FDA at 6-657-FDA-4703.  Where should I keep my medicine?  This drug is given in a hospital or clinic and will not be stored at home.  NOTE: This sheet is a summary. It may not cover all possible information. If you have questions about this medicine, talk to your doctor, pharmacist, or health care provider.  © 2019 [employer] (2015-05-16 14:19:57)

## 2020-01-09 NOTE — PROGRESS NOTES
Patient tolerated infusion without complaint. Patient discharged 30 minutes after infusion complete as this was 1st ever dose of Reclast with AVS at 1432.

## 2020-01-14 RX ORDER — METHIMAZOLE 10 MG/1
10 TABLET ORAL DAILY
Qty: 180 TABLET | Refills: 1
Start: 2020-01-14 | End: 2020-02-21

## 2020-02-11 ENCOUNTER — OFFICE VISIT (OUTPATIENT)
Dept: FAMILY MEDICINE CLINIC | Facility: CLINIC | Age: 65
End: 2020-02-11

## 2020-02-11 VITALS
SYSTOLIC BLOOD PRESSURE: 90 MMHG | BODY MASS INDEX: 23.32 KG/M2 | TEMPERATURE: 98.3 F | HEIGHT: 67 IN | DIASTOLIC BLOOD PRESSURE: 60 MMHG | OXYGEN SATURATION: 98 % | HEART RATE: 77 BPM | WEIGHT: 148.6 LBS

## 2020-02-11 DIAGNOSIS — H65.02 ACUTE SEROUS OTITIS MEDIA OF LEFT EAR, RECURRENCE NOT SPECIFIED: ICD-10-CM

## 2020-02-11 DIAGNOSIS — M79.652 PAIN OF LEFT THIGH: Primary | ICD-10-CM

## 2020-02-11 DIAGNOSIS — M79.662 PAIN OF LEFT LOWER LEG: ICD-10-CM

## 2020-02-11 PROCEDURE — 73590 X-RAY EXAM OF LOWER LEG: CPT | Performed by: INTERNAL MEDICINE

## 2020-02-11 PROCEDURE — 73552 X-RAY EXAM OF FEMUR 2/>: CPT | Performed by: INTERNAL MEDICINE

## 2020-02-11 PROCEDURE — 99214 OFFICE O/P EST MOD 30 MIN: CPT | Performed by: INTERNAL MEDICINE

## 2020-02-11 RX ORDER — AMOXICILLIN 875 MG/1
875 TABLET, COATED ORAL 2 TIMES DAILY
Qty: 20 TABLET | Refills: 0 | Status: SHIPPED | OUTPATIENT
Start: 2020-02-11 | End: 2020-02-21

## 2020-02-11 NOTE — PROGRESS NOTES
Subjective   Megan Ratliff is a 64 y.o. female.  Patient complained of ear fullness with echo type sounds but not diminished hearing both ears perhaps right more so than left.  Also increased pain left leg  Body mass index is 23.27 kg/m².  History of Present Illness   Patient with increased pain in the left leg none mild lower back discomfort history of a significant femur fracture she had both placement of prosthesis at the distal thigh and proximal tib-fib no falls the patient at risk.  But increasing pain particular with ambulation.  No sciatic type complaints breast pain more the mid and distal thigh as well as the proximal regarding the area some mild URI type symptoms clears sinus drainage dose sore throat no fever sputum.  Patient does have osteopenia as well  Is a coating causing itching Percocet caused nausea vomiting and is positive for colon cancer breast cancer arthritis cancer undefined disease hypertension obesity diabetes COPD colon polyps, early death obesity.  Patient's former smoker quit in 2014  Review of Systems   HENT: Positive for ear pain.    All other systems reviewed and are negative.      Objective   Vitals:    02/11/20 1304   BP: 90/60   Pulse: 77   Temp: 98.3 °F (36.8 °C)   SpO2: 98%   Weight: 67.4 kg (148 lb 9.6 oz)     Physical Exam   Constitutional: She appears well-developed and well-nourished.   HENT:   Head: Normocephalic and atraumatic.   Mouth/Throat: Oropharynx is clear and moist.   Left TM partially blocked by wax visible portion does appear dull consistent with otitis media and drainage in ear canal.  Right TM is retracted but no overt infection.   Eyes: Pupils are equal, round, and reactive to light. Conjunctivae are normal.   Cardiovascular: Normal rate, regular rhythm and normal heart sounds.   Pulmonary/Chest: Effort normal and breath sounds normal.   Abdominal: Soft. Bowel sounds are normal.   Musculoskeletal:   Pain with palpation mid and distal thigh no swelling no  redness focal tenderness.  Also discomfort more sore very proximal tib-fib area including the lateral area more the fibular area.  We will proceed with x-rays posterior tibial pulses 2+.  No significant pain with palpation over lower back.   Neurological: She is alert.   Relatively stable gait and station  perhaps minimal limp on the left.   Skin: Skin is warm and dry.   Nursing note and vitals reviewed.      No results found for: INR    Procedures  Left femur x-ray PA and lateral views obtained.  No comparison available.  There is a history for femur fracture actually shattered with implant place distal implant appears to be good some frayed bone at distal end of femur cannot say if this is the usual or not.  But it very significant femur fracture patient had nothing that looks like acute fracture.  Slip given disc of x-ray for patient take with her to her orthopedic surgeon    X-ray left tib-fib no comparison available there is a distal end of the prosthesis in the tibia.  What appears to be evidence of a proximal fibular fracture probably occurring at same time healed no discrete lines to either no acute bony or soft tissue normality is noted no mental fracture observed in that or the left femur fracture.  No comparison available.  Assessment/Plan     1.  Left thigh pain plan do not find anything clearly abnormal on x-rays this is a complex orthopedic problems she needs see orthopedic surgeon ideally one who did the original surgery    2.  Left l tib-fib pain again there is prosthesis present there see orthopedic surgeon for this x-rays are not remarkable    3.  Left otitis media plan amoxicillin 875 twice daily for 10 days time patient is to call follow-up if not well in 10 days time and as needed    Much of this encounter note is an electronic transcription/translation of spoken language to printed text.  The electronic translation of spoken language may permit erroneous, or at times, nonsensical words or  phrases to be inadvertently transcribed.  Although I have reviewed the note for such errors, some may still exist. If there are questions or for further clarification, please contact me.

## 2020-02-14 ENCOUNTER — HOSPITAL ENCOUNTER (OUTPATIENT)
Dept: ULTRASOUND IMAGING | Facility: HOSPITAL | Age: 65
Discharge: HOME | End: 2020-02-14
Admitting: INTERNAL MEDICINE

## 2020-02-14 DIAGNOSIS — E04.2 MULTIPLE THYROID NODULES: ICD-10-CM

## 2020-02-14 PROCEDURE — 76536 US EXAM OF HEAD AND NECK: CPT

## 2020-02-18 DIAGNOSIS — E04.1 THYROID NODULE: Primary | ICD-10-CM

## 2020-02-19 RX ORDER — ESCITALOPRAM OXALATE 20 MG/1
TABLET ORAL
Qty: 90 TABLET | Refills: 1 | Status: SHIPPED | OUTPATIENT
Start: 2020-02-19 | End: 2020-08-31

## 2020-02-21 RX ORDER — METHIMAZOLE 10 MG/1
10 TABLET ORAL DAILY
Qty: 90 TABLET | Refills: 1 | Status: SHIPPED | OUTPATIENT
Start: 2020-02-21 | End: 2020-03-09 | Stop reason: DRUGHIGH

## 2020-02-27 ENCOUNTER — TELEPHONE (OUTPATIENT)
Dept: ENDOCRINOLOGY | Age: 65
End: 2020-02-27

## 2020-02-27 NOTE — TELEPHONE ENCOUNTER
----- Message from Megan Ratliff sent at 2/26/2020  8:18 PM EST -----  Regarding: Test Results Question  Contact: [phone]  Since nodules have increased in size on thyroid. Wouldn't a biopsy tell us more? I am a cancer survivor with a family history of cancer so I'm a little concerned. Thank you in advance.

## 2020-02-28 ENCOUNTER — OFFICE VISIT (OUTPATIENT)
Dept: ENDOCRINOLOGY | Age: 65
End: 2020-02-28

## 2020-02-28 VITALS
SYSTOLIC BLOOD PRESSURE: 118 MMHG | DIASTOLIC BLOOD PRESSURE: 60 MMHG | HEIGHT: 67 IN | HEART RATE: 80 BPM | BODY MASS INDEX: 24.42 KG/M2 | OXYGEN SATURATION: 97 % | WEIGHT: 155.6 LBS

## 2020-02-28 DIAGNOSIS — M85.89 OSTEOPENIA OF MULTIPLE SITES: ICD-10-CM

## 2020-02-28 DIAGNOSIS — E05.90 HYPERTHYROIDISM: Primary | ICD-10-CM

## 2020-02-28 PROBLEM — Z85.3 HISTORY OF BREAST CANCER: Status: ACTIVE | Noted: 2020-02-28

## 2020-02-28 PROCEDURE — 99214 OFFICE O/P EST MOD 30 MIN: CPT | Performed by: INTERNAL MEDICINE

## 2020-02-28 NOTE — PROGRESS NOTES
Subjective   Megan Ratliff is a 64 y.o. female.     F/u to go over thyroid U/S report   Hyperthyroidism,hx of hepC, hepatic Steatosis     Hyperthyroidism   Pertinent negatives include no arthralgias or chest pain.      Patient is a 64-year-old female who came in for follow-up.     In 2019,  she was seen by Dr. Young because of a 40 pound weight loss since 2019.  Work-up done in 2019 showed an elevated free T4 at 1.95 ng per DL, elevated thyroid-stimulating immunoglobulin at 4.13 international units/L and elevated thyroid peroxidase antibody at 288 international units/mL.  Thyroid ultrasound done in 2019 showed slightly heterogeneous diffuse echotexture and multiple subcentimeter sized solid thyroid nodules bilaterally without dominant or grossly suspicious nodule.    She is on Tapazole 10 mg/day.  She has gained 7 pounds since 2019.  He still drinks feels warmer than usual     She denies heat or cold intolerance.  She denies constipation.  She denies palpitations or tremors.     She was given IV contrast agent for CT scan of the abdomen and pelvis on 2019.  CT scan shows small ventral hernia and diffuse hepatic steatosis.      She had an EGD and esophageal dilatation done by Dr. Ureña in 2019.  Colonoscopy done in 2019 showed hemorrhoids.      She is  1 para 2.  She had a hysterectomy with one oophorectomy at age 34 for endometriosis.  She was never on hormone replacement therapy.  Bone density done at [facility] on December 15, 2019 showed osteopenia.  Ten-year risk of major osteoporotic fracture is elevated 25%. She had her first Reclast infusion on 2020.  She had no side effects    She had lumpectomy of the right breast for cancer in  followed by radiation therapy and chemotherapy.  She has no known recurrence since.    The following portions of the patient's history were reviewed and updated as appropriate: allergies,  "current medications, past family history, past medical history, past social history, past surgical history and problem list.    Review of Systems   Constitutional: Negative.    HENT: Negative.    Eyes: Negative.    Respiratory: Negative for shortness of breath and wheezing.    Cardiovascular: Negative for chest pain and palpitations.   Gastrointestinal: Negative.    Endocrine: Negative.    Genitourinary: Negative.    Musculoskeletal: Negative.  Negative for arthralgias.   Neurological: Negative.      Objective      Vitals:    02/28/20 1215   BP: 118/60   BP Location: Right arm   Patient Position: Sitting   Cuff Size: Small Adult   Pulse: 80   SpO2: 97%   Weight: 70.6 kg (155 lb 9.6 oz)   Height: 170.2 cm (67.01\")     Physical Exam   Constitutional: She is oriented to person, place, and time. She appears well-developed and well-nourished. No distress.   HENT:   Head: Normocephalic.   Right Ear: External ear normal.   Left Ear: External ear normal.   Mouth/Throat: Oropharynx is clear and moist. No oropharyngeal exudate.   Eyes: Conjunctivae and EOM are normal. Right eye exhibits no discharge. Left eye exhibits no discharge. No scleral icterus.   Neck: Neck supple. No JVD present. No tracheal deviation present. No thyromegaly present.   Cardiovascular: Normal rate, regular rhythm, normal heart sounds and intact distal pulses. Exam reveals no gallop and no friction rub.   No murmur heard.  Pulmonary/Chest: Effort normal and breath sounds normal. No stridor. No respiratory distress. She has no wheezes. She has no rales. She exhibits no tenderness.   Abdominal: Soft. Bowel sounds are normal. She exhibits no distension and no mass. There is no tenderness. There is no rebound and no guarding. No hernia.   Musculoskeletal: Normal range of motion. She exhibits no edema, tenderness or deformity.   Lymphadenopathy:     She has no cervical adenopathy.   Neurological: She is alert and oriented to person, place, and time. She " displays normal reflexes. Coordination normal.   No tremors   Skin: Skin is warm and dry.   Psychiatric: She has a normal mood and affect. Her behavior is normal.     Results Encounter on 01/06/2020   Component Date Value Ref Range Status   • T3, Free 01/07/2020 6.1* 2.0 - 4.4 pg/mL Final   • Free T4 01/07/2020 1.82* 0.93 - 1.70 ng/dL Final   • TSH 01/07/2020 <0.005* 0.270 - 4.200 uIU/mL Final   • Glucose 01/07/2020 107* 65 - 99 mg/dL Final   • BUN 01/07/2020 9  8 - 23 mg/dL Final   • Creatinine 01/07/2020 0.72  0.57 - 1.00 mg/dL Final   • eGFR Non African Am 01/07/2020 82  >60 mL/min/1.73 Final   • eGFR African Am 01/07/2020 99  >60 mL/min/1.73 Final   • BUN/Creatinine Ratio 01/07/2020 12.5  7.0 - 25.0 Final   • Sodium 01/07/2020 140  136 - 145 mmol/L Final   • Potassium 01/07/2020 4.0  3.5 - 5.2 mmol/L Final   • Chloride 01/07/2020 99  98 - 107 mmol/L Final   • Total CO2 01/07/2020 30.2* 22.0 - 29.0 mmol/L Final   • Calcium 01/07/2020 8.8  8.6 - 10.5 mg/dL Final   • Total Protein 01/07/2020 6.8  6.0 - 8.5 g/dL Final   • Albumin 01/07/2020 4.10  3.50 - 5.20 g/dL Final   • Globulin 01/07/2020 2.7  gm/dL Final   • A/G Ratio 01/07/2020 1.5  g/dL Final   • Total Bilirubin 01/07/2020 0.4  0.2 - 1.2 mg/dL Final   • Alkaline Phosphatase 01/07/2020 152* 39 - 117 U/L Final   • AST (SGOT) 01/07/2020 15  1 - 32 U/L Final   • ALT (SGPT) 01/07/2020 13  1 - 33 U/L Final     Assessment/Plan   Megan was seen today for hyperthyroidism and hepatic steatosis.    Diagnoses and all orders for this visit:    Hyperthyroidism  -     TSH  -     T4, Free    Osteopenia of multiple sites  -     Vitamin D 25 Hydroxy      Continue Tapazole 10 mg/day.  Discussed with patient her ultrasound report and risk of malignancy.  Repeat thyroid ultrasound in 6 months.  Continue calcium plus D 1 tablet once a day plus vitamin D3 1000 units/day.  Check vitamin D levels.  Continue yearly Reclast.    Copy of my note sent to Dr. Young.    RTC as  scheduled

## 2020-02-29 LAB
25(OH)D3+25(OH)D2 SERPL-MCNC: 40.4 NG/ML (ref 30–100)
T4 FREE SERPL-MCNC: 0.91 NG/DL (ref 0.93–1.7)
TSH SERPL DL<=0.005 MIU/L-ACNC: 2.34 UIU/ML (ref 0.27–4.2)

## 2020-03-09 DIAGNOSIS — E05.90 HYPERTHYROIDISM: Primary | ICD-10-CM

## 2020-03-09 RX ORDER — METHIMAZOLE 10 MG/1
TABLET ORAL
Qty: 90 TABLET | Refills: 1
Start: 2020-03-09 | End: 2020-08-27

## 2020-04-09 ENCOUNTER — RESULTS ENCOUNTER (OUTPATIENT)
Dept: ENDOCRINOLOGY | Age: 65
End: 2020-04-09

## 2020-04-09 DIAGNOSIS — E05.90 HYPERTHYROIDISM: ICD-10-CM

## 2020-05-20 NOTE — PROGRESS NOTES
Subjective   Megan Ratliff is a 64 y.o. female.     F/u for hypothyroidism, hx of Hep C, hepatic steatosis      Patient is a 64-year-old female who came in for follow-up.     In 2019,  she was seen by Dr. Young because of a 40 pound weight loss since 2019.  Work-up done in 2019 showed an elevated free T4 at 1.95 ng per DL, elevated thyroid-stimulating immunoglobulin at 4.13 international units/L and elevated thyroid peroxidase antibody at 288 international units/mL.  Thyroid ultrasound done in 2019 showed slightly heterogeneous diffuse echotexture and multiple subcentimeter sized solid thyroid nodules bilaterally without dominant or grossly suspicious nodule.     She is on Tapazole 10 mg 1/2 tab/day.  She has gained 9 lbs since .  She denies heat or cold intolerance.  She denies bowel changes.  She denies palpitations or tremors.     She was given IV contrast agent for CT scan of the abdomen and pelvis on 2019.  CT scan shows small ventral hernia and diffuse hepatic steatosis.      She had an EGD and esophageal dilatation done by Dr. Ureña in 2019.  Colonoscopy done in 2019 showed hemorrhoids.      She is  1 para 2.  She had a hysterectomy with one oophorectomy at age 34 for endometriosis.  She was never on hormone replacement therapy.  Bone density done at [facility] on December 15, 2019 showed osteopenia.  Ten-year risk of major osteoporotic fracture is elevated 25%. She had her first Reclast infusion on 2020.  She had no side effects.  She is on vit D3 1000 units/day.     She had lumpectomy of the right breast for cancer in 2005 followed by radiation therapy and chemotherapy.  She has no known recurrence since and has regular mammograms done at [facility].  She saw Dr. Newton in 2019.    The following portions of the patient's history were reviewed and updated as appropriate: allergies, current medications, past family  "history, past medical history, past social history, past surgical history and problem list.    Review of Systems   HENT: Negative.    Eyes: Negative.  Negative for visual disturbance.   Respiratory: Negative for cough and shortness of breath.    Cardiovascular: Negative for chest pain, palpitations and leg swelling.   Gastrointestinal: Negative.    Endocrine: Negative.    Genitourinary: Negative.  Negative for dysuria and frequency.   Musculoskeletal: Negative.  Negative for myalgias.   Neurological: Negative for dizziness, weakness and numbness.   Hematological: Negative for adenopathy. Does not bruise/bleed easily.     Objective      Vitals:    05/28/20 1350   BP: 142/72   BP Location: Right arm   Patient Position: Sitting   Cuff Size: Large Adult   Pulse: 64   SpO2: 98%   Weight: 74.7 kg (164 lb 9.6 oz)   Height: 170.2 cm (67.01\")     Physical Exam   Constitutional: She is oriented to person, place, and time. She appears well-developed and well-nourished. No distress.   HENT:   Head: Normocephalic.   Right Ear: External ear normal.   Left Ear: External ear normal.   Nose: Nose normal.   Mouth/Throat: Oropharynx is clear and moist. No oropharyngeal exudate.   Eyes: Conjunctivae and EOM are normal. Right eye exhibits no discharge. Left eye exhibits no discharge. No scleral icterus.   Neck: No JVD present. No tracheal deviation present. No thyromegaly present.   Cardiovascular: Normal rate. Exam reveals no friction rub.   Pulmonary/Chest: Effort normal and breath sounds normal. She has no wheezes. She exhibits no tenderness.   Abdominal: Soft. Bowel sounds are normal. She exhibits no distension. There is no tenderness.   Musculoskeletal: She exhibits no edema, tenderness or deformity.   Lymphadenopathy:     She has no cervical adenopathy.   Neurological: She is alert and oriented to person, place, and time.   Skin: No erythema. No pallor.     Office Visit on 02/28/2020   Component Date Value Ref Range Status   • TSH " 02/28/2020 2.340  0.270 - 4.200 uIU/mL Final   • Free T4 02/28/2020 0.91* 0.93 - 1.70 ng/dL Final    Results may be falsely increased if patient taking Biotin.   • 25 Hydroxy, Vitamin D 02/28/2020 40.4  30.0 - 100.0 ng/ml Final    Comment: Results may be falsely increased if patient taking Biotin.  Reference Range for Total Vitamin D 25(OH)  Deficiency <20.0 ng/mL  Insufficiency 21-29 ng/mL  Sufficiency  ng/mL  Toxicity >100 ng/ml       Assessment/Plan   Megan was seen today for hyperthyroidism, hx of hep c and hepatic steatosis.    Diagnoses and all orders for this visit:    Hyperthyroidism  -     Comprehensive Metabolic Panel  -     TSH  -     T4, Free  -     US Thyroid  -     CBC & Differential    Hepatic steatosis  -     Comprehensive Metabolic Panel  -     Lipid Panel  -     CBC & Differential    Osteopenia of multiple sites  -     Comprehensive Metabolic Panel  -     Vitamin D 25 Hydroxy    Multinodular thyroid  -     US Thyroid      Continue Tapazole 10 mg 1/2 tablet/day.  Check thyroid function tests.  Schedule thyroid ultrasound.  Continue vitamin D 1000 units/day.  Continue yearly Reclast.    Copy of my note sent to Dr. Young    RTC 4 mos.

## 2020-05-28 ENCOUNTER — OFFICE VISIT (OUTPATIENT)
Dept: ENDOCRINOLOGY | Age: 65
End: 2020-05-28

## 2020-05-28 VITALS
HEART RATE: 64 BPM | HEIGHT: 67 IN | SYSTOLIC BLOOD PRESSURE: 142 MMHG | WEIGHT: 164.6 LBS | DIASTOLIC BLOOD PRESSURE: 72 MMHG | BODY MASS INDEX: 25.83 KG/M2 | OXYGEN SATURATION: 98 %

## 2020-05-28 DIAGNOSIS — M85.89 OSTEOPENIA OF MULTIPLE SITES: ICD-10-CM

## 2020-05-28 DIAGNOSIS — E05.90 HYPERTHYROIDISM: Primary | ICD-10-CM

## 2020-05-28 DIAGNOSIS — K76.0 HEPATIC STEATOSIS: ICD-10-CM

## 2020-05-28 DIAGNOSIS — E04.2 MULTINODULAR THYROID: ICD-10-CM

## 2020-05-28 PROCEDURE — 99214 OFFICE O/P EST MOD 30 MIN: CPT | Performed by: INTERNAL MEDICINE

## 2020-05-29 LAB
25(OH)D3+25(OH)D2 SERPL-MCNC: 34.9 NG/ML (ref 30–100)
ALBUMIN SERPL-MCNC: 4.5 G/DL (ref 3.5–5.2)
ALBUMIN/GLOB SERPL: 2.1 G/DL
ALP SERPL-CCNC: 81 U/L (ref 39–117)
ALT SERPL-CCNC: 12 U/L (ref 1–33)
AST SERPL-CCNC: 17 U/L (ref 1–32)
BASOPHILS # BLD AUTO: 0.02 10*3/MM3 (ref 0–0.2)
BASOPHILS NFR BLD AUTO: 0.5 % (ref 0–1.5)
BILIRUB SERPL-MCNC: 0.2 MG/DL (ref 0.2–1.2)
BUN SERPL-MCNC: 10 MG/DL (ref 8–23)
BUN/CREAT SERPL: 14.1 (ref 7–25)
CALCIUM SERPL-MCNC: 8.8 MG/DL (ref 8.6–10.5)
CHLORIDE SERPL-SCNC: 100 MMOL/L (ref 98–107)
CHOLEST SERPL-MCNC: 167 MG/DL (ref 0–200)
CO2 SERPL-SCNC: 33 MMOL/L (ref 22–29)
CREAT SERPL-MCNC: 0.71 MG/DL (ref 0.57–1)
EOSINOPHIL # BLD AUTO: 0 10*3/MM3 (ref 0–0.4)
EOSINOPHIL NFR BLD AUTO: 0 % (ref 0.3–6.2)
ERYTHROCYTE [DISTWIDTH] IN BLOOD BY AUTOMATED COUNT: 13.7 % (ref 12.3–15.4)
GLOBULIN SER CALC-MCNC: 2.1 GM/DL
GLUCOSE SERPL-MCNC: 109 MG/DL (ref 65–99)
HCT VFR BLD AUTO: 35.4 % (ref 34–46.6)
HDLC SERPL-MCNC: 82 MG/DL (ref 40–60)
HGB BLD-MCNC: 11.9 G/DL (ref 12–15.9)
IMM GRANULOCYTES # BLD AUTO: 0.01 10*3/MM3 (ref 0–0.05)
IMM GRANULOCYTES NFR BLD AUTO: 0.2 % (ref 0–0.5)
INTERPRETATION: NORMAL
LDLC SERPL CALC-MCNC: 66 MG/DL (ref 0–100)
LYMPHOCYTES # BLD AUTO: 1.84 10*3/MM3 (ref 0.7–3.1)
LYMPHOCYTES NFR BLD AUTO: 43.9 % (ref 19.6–45.3)
MCH RBC QN AUTO: 30.1 PG (ref 26.6–33)
MCHC RBC AUTO-ENTMCNC: 33.6 G/DL (ref 31.5–35.7)
MCV RBC AUTO: 89.6 FL (ref 79–97)
MONOCYTES # BLD AUTO: 0.34 10*3/MM3 (ref 0.1–0.9)
MONOCYTES NFR BLD AUTO: 8.1 % (ref 5–12)
NEUTROPHILS # BLD AUTO: 1.98 10*3/MM3 (ref 1.7–7)
NEUTROPHILS NFR BLD AUTO: 47.3 % (ref 42.7–76)
NRBC BLD AUTO-RTO: 0 /100 WBC (ref 0–0.2)
PLATELET # BLD AUTO: 239 10*3/MM3 (ref 140–450)
POTASSIUM SERPL-SCNC: 4.3 MMOL/L (ref 3.5–5.2)
PROT SERPL-MCNC: 6.6 G/DL (ref 6–8.5)
RBC # BLD AUTO: 3.95 10*6/MM3 (ref 3.77–5.28)
SODIUM SERPL-SCNC: 139 MMOL/L (ref 136–145)
T4 FREE SERPL-MCNC: 0.86 NG/DL (ref 0.93–1.7)
TRIGL SERPL-MCNC: 95 MG/DL (ref 0–150)
TSH SERPL DL<=0.005 MIU/L-ACNC: 2.18 UIU/ML (ref 0.27–4.2)
VLDLC SERPL CALC-MCNC: 19 MG/DL (ref 5–40)
WBC # BLD AUTO: 4.19 10*3/MM3 (ref 3.4–10.8)

## 2020-06-01 DIAGNOSIS — E05.90 HYPERTHYROIDISM: Primary | ICD-10-CM

## 2020-06-01 DIAGNOSIS — E04.2 MULTINODULAR THYROID: ICD-10-CM

## 2020-06-05 ENCOUNTER — HOSPITAL ENCOUNTER (OUTPATIENT)
Dept: ULTRASOUND IMAGING | Facility: HOSPITAL | Age: 65
Discharge: HOME | End: 2020-06-05
Admitting: INTERNAL MEDICINE

## 2020-06-05 PROCEDURE — 76536 US EXAM OF HEAD AND NECK: CPT

## 2020-06-26 ENCOUNTER — RESULTS ENCOUNTER (OUTPATIENT)
Dept: ENDOCRINOLOGY | Age: 65
End: 2020-06-26

## 2020-06-26 DIAGNOSIS — E05.90 HYPERTHYROIDISM: ICD-10-CM

## 2020-06-26 DIAGNOSIS — E04.2 MULTINODULAR THYROID: ICD-10-CM

## 2020-06-27 LAB
T3FREE SERPL-MCNC: 2.7 PG/ML (ref 2–4.4)
T4 FREE SERPL-MCNC: 0.9 NG/DL (ref 0.93–1.7)
TSH SERPL DL<=0.005 MIU/L-ACNC: 0.73 UIU/ML (ref 0.27–4.2)

## 2020-07-02 DIAGNOSIS — E05.90 HYPERTHYROIDISM: Primary | ICD-10-CM

## 2020-07-02 DIAGNOSIS — E04.2 MULTINODULAR THYROID: ICD-10-CM

## 2020-08-27 RX ORDER — METHIMAZOLE 10 MG/1
TABLET ORAL
Qty: 90 TABLET | Refills: 0 | Status: SHIPPED | OUTPATIENT
Start: 2020-08-27 | End: 2020-09-25

## 2020-08-31 RX ORDER — ESCITALOPRAM OXALATE 20 MG/1
TABLET ORAL
Qty: 90 TABLET | Refills: 1 | Status: SHIPPED | OUTPATIENT
Start: 2020-08-31 | End: 2021-01-22 | Stop reason: SDUPTHER

## 2020-09-03 ENCOUNTER — RESULTS ENCOUNTER (OUTPATIENT)
Dept: ENDOCRINOLOGY | Age: 65
End: 2020-09-03

## 2020-09-03 DIAGNOSIS — E05.90 HYPERTHYROIDISM: ICD-10-CM

## 2020-09-03 DIAGNOSIS — E04.2 MULTINODULAR THYROID: ICD-10-CM

## 2020-09-04 LAB
T3FREE SERPL-MCNC: 3 PG/ML (ref 2–4.4)
T4 FREE SERPL-MCNC: 1.23 NG/DL (ref 0.93–1.7)
TSH SERPL DL<=0.005 MIU/L-ACNC: 0.97 UIU/ML (ref 0.27–4.2)

## 2020-09-14 NOTE — TELEPHONE ENCOUNTER
..   I called Megan to let her know her genetics appointment date and time. I left a message stating the appointment is Sept 17th at 9 am. I asked her to call back at her convenience to confirm the appointment and I left my contact information.

## 2020-09-17 NOTE — PROGRESS NOTES
Subjective   Megan Ratliff is a 64 y.o. female.     F/u for hypothyroidism, hx of Hep C, hepatic steatosis      Patient is a 64-year-old female who came in for follow-up.     In 2019,  she was seen by Dr. Young because of a 40 pound weight loss since 2019.  Work-up done in 2019 showed an elevated free T4 at 1.95 ng per DL, elevated thyroid-stimulating immunoglobulin at 4.13 international units/L and elevated thyroid peroxidase antibody at 288 international units/mL.  Thyroid ultrasound done in 2019 showed slightly heterogeneous diffuse echotexture and multiple subcentimeter sized solid thyroid nodules bilaterally without dominant or grossly suspicious nodule.     She is on Tapazole 10 mg 1/2 tab/day.  She has gained 5 lbs since .  She denies heat or cold intolerance.  She denies bowel changes.  She denies palpitations or tremors.  TSH done in 2020 is normal at 0.967 with a normal free T4 of 1.23 ng per DL..     She was given IV contrast agent for CT scan of the abdomen and pelvis on 2019.  CT scan shows small ventral hernia and diffuse hepatic steatosis.      She had an EGD and esophageal dilatation done by Dr. Ureña in 2019.  Colonoscopy done in 2019 showed hemorrhoids.      She is  1 para 2.  She had a hysterectomy with one oophorectomy at age 34 for endometriosis.  She was never on hormone replacement therapy.  Bone density done at [facility] on December 15, 2019 showed osteopenia.  Ten-year risk of major osteoporotic fracture is elevated 25%. She had her first Reclast infusion on 2020.  She had no side effects.  She is on vit D3 1000 units/day.     She had lumpectomy of the right breast for cancer in 2005 followed by radiation therapy and chemotherapy.  She has no known recurrence since and has regular mammograms done at [facility].  She saw Dr. Newton in 2019.    The following portions of the patient's  "history were reviewed and updated as appropriate: allergies, current medications, past family history, past medical history, past social history, past surgical history and problem list.    Review of Systems   Constitutional: Negative.  Negative for fatigue and fever.   HENT: Negative.    Eyes: Negative.    Respiratory: Negative for cough, shortness of breath and wheezing.    Cardiovascular: Negative for chest pain and palpitations.   Gastrointestinal: Negative for abdominal distention, abdominal pain, constipation, diarrhea and nausea.   Endocrine: Negative for cold intolerance and heat intolerance.   Genitourinary: Negative.    Musculoskeletal: Negative.  Negative for arthralgias and myalgias.   Neurological: Negative for dizziness, light-headedness and headaches.   Hematological: Negative.  Negative for adenopathy. Does not bruise/bleed easily.   Psychiatric/Behavioral: Negative for sleep disturbance.     Objective      Vitals:    09/25/20 1333   BP: 132/82   Resp: 16   Weight: 76.7 kg (169 lb 3.2 oz)   Height: 170.2 cm (67.01\")     Physical Exam  Constitutional:       General: She is not in acute distress.     Appearance: Normal appearance. She is obese. She is not ill-appearing or diaphoretic.   HENT:      Head: Normocephalic.      Nose: No congestion or rhinorrhea.   Eyes:      General: No scleral icterus.        Right eye: No discharge.         Left eye: No discharge.      Extraocular Movements: Extraocular movements intact.      Conjunctiva/sclera: Conjunctivae normal.   Neck:      Musculoskeletal: No neck rigidity or muscular tenderness.      Vascular: No carotid bruit.   Cardiovascular:      Rate and Rhythm: Normal rate and regular rhythm.      Heart sounds: Normal heart sounds. No murmur. No friction rub. No gallop.    Pulmonary:      Effort: No respiratory distress.      Breath sounds: Normal breath sounds. No stridor. No wheezing, rhonchi or rales.   Chest:      Chest wall: No tenderness.   Abdominal:    "   General: Bowel sounds are normal. There is no distension.      Palpations: Abdomen is soft. There is no mass.      Tenderness: There is no abdominal tenderness. There is no guarding.      Hernia: No hernia is present.   Musculoskeletal: Normal range of motion.         General: No tenderness.      Right lower leg: No edema.      Left lower leg: No edema.   Lymphadenopathy:      Cervical: No cervical adenopathy.   Skin:     General: Skin is warm and dry.   Neurological:      General: No focal deficit present.      Mental Status: She is alert and oriented to person, place, and time.   Psychiatric:         Mood and Affect: Mood normal.         Behavior: Behavior normal.       Results Encounter on 09/03/2020   Component Date Value Ref Range Status   • T3, Free 09/03/2020 3.0  2.0 - 4.4 pg/mL Final   • Free T4 09/03/2020 1.23  0.93 - 1.70 ng/dL Final    Results may be falsely increased if patient taking Biotin.   • TSH 09/03/2020 0.967  0.270 - 4.200 uIU/mL Final     Assessment/Plan   Megan was seen today for hyperthyroidism.    Diagnoses and all orders for this visit:    Hyperthyroidism    Multinodular thyroid    Osteopenia of multiple sites    Other orders  -     Discontinue: methIMAzole (TAPAZOLE) 5 MG tablet; 1 tab daily  -     methIMAzole (TAPAZOLE) 5 MG tablet; 1 tab daily      Continue methimazole 5 mg/day.  Discussed options for therapy which includes radioactive iodine, surgery, and antithyroid medication.  Continue calcium plus D 1 tablet daily and vitamin D3 1000 units/day.  Flu vaccine this fall    Copy of my note sent to Dr. Young    RTC 4 mos.

## 2020-09-25 ENCOUNTER — OFFICE VISIT (OUTPATIENT)
Dept: ENDOCRINOLOGY | Age: 65
End: 2020-09-25

## 2020-09-25 VITALS
DIASTOLIC BLOOD PRESSURE: 82 MMHG | WEIGHT: 169.2 LBS | RESPIRATION RATE: 16 BRPM | BODY MASS INDEX: 26.56 KG/M2 | HEIGHT: 67 IN | SYSTOLIC BLOOD PRESSURE: 132 MMHG

## 2020-09-25 DIAGNOSIS — E04.2 MULTINODULAR THYROID: ICD-10-CM

## 2020-09-25 DIAGNOSIS — M85.89 OSTEOPENIA OF MULTIPLE SITES: ICD-10-CM

## 2020-09-25 DIAGNOSIS — E05.90 HYPERTHYROIDISM: Primary | ICD-10-CM

## 2020-09-25 PROCEDURE — 99214 OFFICE O/P EST MOD 30 MIN: CPT | Performed by: INTERNAL MEDICINE

## 2020-09-25 RX ORDER — METHIMAZOLE 5 MG/1
TABLET ORAL
Qty: 90 TABLET | Refills: 1 | Status: SHIPPED | OUTPATIENT
Start: 2020-09-25 | End: 2021-05-14 | Stop reason: SDUPTHER

## 2020-09-25 RX ORDER — METHIMAZOLE 5 MG/1
TABLET ORAL
Start: 2020-09-25 | End: 2020-09-25 | Stop reason: SDUPTHER

## 2020-12-09 ENCOUNTER — TELEPHONE (OUTPATIENT)
Dept: FAMILY MEDICINE CLINIC | Facility: CLINIC | Age: 65
End: 2020-12-09

## 2020-12-09 NOTE — TELEPHONE ENCOUNTER
Patient called to request a new referral and appt for a Reclast infusion.    Please advise    [phone]

## 2020-12-22 ENCOUNTER — TELEPHONE (OUTPATIENT)
Dept: ENDOCRINOLOGY | Age: 65
End: 2020-12-22

## 2021-01-14 DIAGNOSIS — E04.2 MULTINODULAR THYROID: ICD-10-CM

## 2021-01-14 DIAGNOSIS — E05.90 HYPERTHYROIDISM: Primary | ICD-10-CM

## 2021-01-22 RX ORDER — ESCITALOPRAM OXALATE 20 MG/1
20 TABLET ORAL DAILY
Qty: 90 TABLET | Refills: 0 | Status: SHIPPED | OUTPATIENT
Start: 2021-01-22 | End: 2021-02-22

## 2021-01-23 LAB
T3FREE SERPL-MCNC: 2.5 PG/ML (ref 2–4.4)
T4 FREE SERPL-MCNC: 0.99 NG/DL (ref 0.93–1.7)
TSH SERPL DL<=0.005 MIU/L-ACNC: 0.85 UIU/ML (ref 0.27–4.2)

## 2021-02-05 ENCOUNTER — OFFICE VISIT (OUTPATIENT)
Dept: ENDOCRINOLOGY | Age: 66
End: 2021-02-05

## 2021-02-05 VITALS
DIASTOLIC BLOOD PRESSURE: 80 MMHG | OXYGEN SATURATION: 99 % | WEIGHT: 164 LBS | HEIGHT: 67 IN | SYSTOLIC BLOOD PRESSURE: 126 MMHG | HEART RATE: 67 BPM | BODY MASS INDEX: 25.74 KG/M2

## 2021-02-05 DIAGNOSIS — M85.89 OSTEOPENIA OF MULTIPLE SITES: Primary | ICD-10-CM

## 2021-02-05 DIAGNOSIS — E05.90 HYPERTHYROIDISM: ICD-10-CM

## 2021-02-05 DIAGNOSIS — K76.0 HEPATIC STEATOSIS: ICD-10-CM

## 2021-02-05 DIAGNOSIS — E04.2 MULTINODULAR THYROID: ICD-10-CM

## 2021-02-05 PROCEDURE — 99214 OFFICE O/P EST MOD 30 MIN: CPT | Performed by: INTERNAL MEDICINE

## 2021-02-05 RX ORDER — SODIUM CHLORIDE 9 MG/ML
250 INJECTION, SOLUTION INTRAVENOUS ONCE
Status: CANCELLED | OUTPATIENT
Start: 2021-02-05

## 2021-02-05 RX ORDER — ASPIRIN 81 MG/1
81 TABLET, CHEWABLE ORAL DAILY
COMMUNITY

## 2021-02-05 RX ORDER — MULTIPLE VITAMINS W/ MINERALS TAB 9MG-400MCG
1 TAB ORAL DAILY
COMMUNITY

## 2021-02-06 LAB
25(OH)D3+25(OH)D2 SERPL-MCNC: 37.9 NG/ML (ref 30–100)
ALBUMIN SERPL-MCNC: 4.1 G/DL (ref 3.8–4.8)
ALBUMIN/GLOB SERPL: 1.7 {RATIO} (ref 1.2–2.2)
ALP SERPL-CCNC: 75 IU/L (ref 39–117)
ALT SERPL-CCNC: 11 IU/L (ref 0–32)
AST SERPL-CCNC: 16 IU/L (ref 0–40)
BILIRUB SERPL-MCNC: 0.4 MG/DL (ref 0–1.2)
BUN SERPL-MCNC: 13 MG/DL (ref 8–27)
BUN/CREAT SERPL: 17 (ref 12–28)
CALCIUM SERPL-MCNC: 8.9 MG/DL (ref 8.7–10.3)
CHLORIDE SERPL-SCNC: 101 MMOL/L (ref 96–106)
CHOLEST SERPL-MCNC: 183 MG/DL (ref 100–199)
CO2 SERPL-SCNC: 27 MMOL/L (ref 20–29)
CREAT SERPL-MCNC: 0.75 MG/DL (ref 0.57–1)
GLOBULIN SER CALC-MCNC: 2.4 G/DL (ref 1.5–4.5)
GLUCOSE SERPL-MCNC: 96 MG/DL (ref 65–99)
HDLC SERPL-MCNC: 82 MG/DL
INTERPRETATION: NORMAL
LDLC SERPL CALC-MCNC: 88 MG/DL (ref 0–99)
MAGNESIUM SERPL-MCNC: 1.9 MG/DL (ref 1.6–2.3)
PHOSPHATE SERPL-MCNC: 4.4 MG/DL (ref 3–4.3)
POTASSIUM SERPL-SCNC: 4.7 MMOL/L (ref 3.5–5.2)
PROT SERPL-MCNC: 6.5 G/DL (ref 6–8.5)
SODIUM SERPL-SCNC: 142 MMOL/L (ref 134–144)
TRIGL SERPL-MCNC: 70 MG/DL (ref 0–149)
VLDLC SERPL CALC-MCNC: 13 MG/DL (ref 5–40)

## 2021-02-08 RX ORDER — ASCORBIC ACID 250 MG
250 TABLET,CHEWABLE ORAL DAILY
Start: 2021-02-08 | End: 2021-08-09

## 2021-02-16 RX ORDER — METHIMAZOLE 10 MG/1
TABLET ORAL
Qty: 1 TABLET | Refills: 0 | Status: SHIPPED | OUTPATIENT
Start: 2021-02-16 | End: 2021-05-14 | Stop reason: DRUGHIGH

## 2021-02-22 RX ORDER — ESCITALOPRAM OXALATE 20 MG/1
TABLET ORAL
Qty: 30 TABLET | Refills: 0 | Status: SHIPPED | OUTPATIENT
Start: 2021-02-22 | End: 2021-05-11 | Stop reason: SDUPTHER

## 2021-02-25 ENCOUNTER — HOSPITAL ENCOUNTER (OUTPATIENT)
Dept: INFUSION THERAPY | Facility: HOSPITAL | Age: 66
Discharge: HOME | End: 2021-02-25
Admitting: INTERNAL MEDICINE

## 2021-02-25 VITALS
OXYGEN SATURATION: 97 % | WEIGHT: 164 LBS | TEMPERATURE: 98.4 F | BODY MASS INDEX: 25.68 KG/M2 | SYSTOLIC BLOOD PRESSURE: 117 MMHG | RESPIRATION RATE: 20 BRPM | HEART RATE: 80 BPM | DIASTOLIC BLOOD PRESSURE: 76 MMHG

## 2021-02-25 DIAGNOSIS — N95.1 SYMPTOMATIC MENOPAUSAL OR FEMALE CLIMACTERIC STATES: Primary | ICD-10-CM

## 2021-02-25 DIAGNOSIS — M85.89 OSTEOPENIA OF MULTIPLE SITES: ICD-10-CM

## 2021-02-25 PROCEDURE — 96365 THER/PROPH/DIAG IV INF INIT: CPT

## 2021-02-25 PROCEDURE — 25010000002 ZOLEDRONIC ACID 5 MG/100ML SOLUTION: Performed by: INTERNAL MEDICINE

## 2021-02-25 RX ORDER — ZOLEDRONIC ACID 5 MG/100ML
5 INJECTION, SOLUTION INTRAVENOUS ONCE
Status: COMPLETED | OUTPATIENT
Start: 2021-02-25 | End: 2021-02-25

## 2021-02-25 RX ORDER — ZOLEDRONIC ACID 5 MG/100ML
5 INJECTION, SOLUTION INTRAVENOUS ONCE
Status: CANCELLED | OUTPATIENT
Start: 2022-02-25

## 2021-02-25 RX ORDER — SODIUM CHLORIDE 9 MG/ML
250 INJECTION, SOLUTION INTRAVENOUS ONCE
Status: DISCONTINUED | OUTPATIENT
Start: 2021-02-25 | End: 2021-02-27 | Stop reason: HOSPADM

## 2021-02-25 RX ORDER — SODIUM CHLORIDE 9 MG/ML
250 INJECTION, SOLUTION INTRAVENOUS ONCE
Status: CANCELLED | OUTPATIENT
Start: 2022-02-25

## 2021-02-25 RX ADMIN — ZOLEDRONIC ACID 5 MG: 0.05 INJECTION, SOLUTION INTRAVENOUS at 14:22

## 2021-03-16 ENCOUNTER — BULK ORDERING (OUTPATIENT)
Dept: CASE MANAGEMENT | Facility: OTHER | Age: 66
End: 2021-03-16

## 2021-03-16 DIAGNOSIS — Z23 IMMUNIZATION DUE: ICD-10-CM

## 2021-05-10 RX ORDER — ESCITALOPRAM OXALATE 20 MG/1
20 TABLET ORAL DAILY
Qty: 30 TABLET | Refills: 0 | OUTPATIENT
Start: 2021-05-10

## 2021-05-11 RX ORDER — ESCITALOPRAM OXALATE 20 MG/1
20 TABLET ORAL DAILY
Qty: 30 TABLET | Refills: 0 | Status: SHIPPED | OUTPATIENT
Start: 2021-05-11

## 2021-05-12 RX ORDER — METHIMAZOLE 5 MG/1
TABLET ORAL
Qty: 90 TABLET | Refills: 1 | Status: CANCELLED | OUTPATIENT
Start: 2021-05-12

## 2021-05-12 RX ORDER — ESCITALOPRAM OXALATE 20 MG/1
20 TABLET ORAL DAILY
Qty: 30 TABLET | Refills: 0 | OUTPATIENT
Start: 2021-05-12

## 2021-05-15 RX ORDER — METHIMAZOLE 5 MG/1
TABLET ORAL
Qty: 90 TABLET | Refills: 1 | Status: SHIPPED | OUTPATIENT
Start: 2021-05-15 | End: 2021-05-21 | Stop reason: SDUPTHER

## 2021-05-15 RX ORDER — METHIMAZOLE 5 MG/1
TABLET ORAL
Qty: 90 TABLET | Refills: 1 | Status: SHIPPED | OUTPATIENT
Start: 2021-05-15 | End: 2021-05-15 | Stop reason: SDUPTHER

## 2021-05-23 RX ORDER — METHIMAZOLE 5 MG/1
TABLET ORAL
Qty: 90 TABLET | Refills: 1 | Status: SHIPPED | OUTPATIENT
Start: 2021-05-23 | End: 2021-11-24

## 2021-06-07 RX ORDER — ESCITALOPRAM OXALATE 20 MG/1
TABLET ORAL
Qty: 30 TABLET | Refills: 0 | OUTPATIENT
Start: 2021-06-07

## 2021-06-24 ENCOUNTER — TELEPHONE (OUTPATIENT)
Dept: SURGERY | Facility: CLINIC | Age: 66
End: 2021-06-24

## 2021-06-24 NOTE — TELEPHONE ENCOUNTER
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 You told me to call her regarding this and to refer her out or follow you for high risk. She is actually having severe intermittent pain in her breast and was wondering if the mastectomy would take care of this or will her skin still be painful. Will she continue with chest wall pain after surgery?

## 2021-07-02 ENCOUNTER — TELEPHONE (OUTPATIENT)
Dept: MAMMOGRAPHY | Facility: CLINIC | Age: 66
End: 2021-07-02

## 2021-07-02 NOTE — TELEPHONE ENCOUNTER
I was able to speak with her today.  She has intermittent breast pain that has been going on for 15 years.  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  She wondered if a mastectomy would help with the pain.  I told her there is probably a small chance it would but there is also probably the greater chance that it could make her worse or she could have a complication.  I would not push her to have surgery in this setting.

## 2021-08-05 NOTE — PROGRESS NOTES
Subjective   Megan Ratliff is a 65 y.o. female.     F/u for hypothyroidism, hx of Hep C, hepatic steatosis         Patient is a 65-year-old female who came in for follow-up.     In 2019,  she was seen by Dr. Young because of a 40 pound weight loss since 2019.  Work-up done in 2019 showed an elevated free T4 at 1.95 ng per DL, elevated thyroid-stimulating immunoglobulin at 4.13 international units/L and elevated thyroid peroxidase antibody at 288 international units/mL.  Thyroid ultrasound done in 2019 showed slightly heterogeneous diffuse echotexture and multiple subcentimeter sized solid thyroid nodules bilaterally without dominant or grossly suspicious nodule.     She is on Tapazole 10 mg 1/2 tab/day.  She has intentionally lost 5 pounds since since 2021.  She denies heat or cold intolerance.  She denies bowel changes.  She denies palpitations or tremors.       CT scan shows diffuse hepatic steatosis.  Both parents have diabetes mellitus.  Hemoglobin A1c done in May 2021 is 5.2%.  Her last meal was last night.     She had an EGD and esophageal dilatation done by Dr. Ureña in 2019.  Colonoscopy done in 2019 showed hemorrhoids.      She is  1 para 2.  She had a hysterectomy with one oophorectomy at age 34 for endometriosis.  She was never on hormone replacement therapy.  Bone density done at [place] on December 15, 2019 showed osteopenia.  Ten-year risk of major osteoporotic fracture is elevated 25%. She had her first Reclast infusion on 2020.  She had no side effects.  She is on vit D3 1000 units/day.     Her second dose of Reclast was in 2021 without major side effects.     She had lumpectomy of the right breast for cancer in 2005 followed by radiation therapy and chemotherapy.  She has no known recurrence since and has regular mammograms done at [facility].  She saw Dr. Newton in .  She is beng monitored by  "PCP.      The following portions of the patient's history were reviewed and updated as appropriate: allergies, current medications, past family history, past medical history, past social history, past surgical history and problem list.    Review of Systems   Eyes: Negative for visual disturbance.   Respiratory: Negative.    Cardiovascular: Negative.  Negative for chest pain, palpitations and leg swelling.   Gastrointestinal: Negative.    Endocrine: Negative.  Negative for cold intolerance and heat intolerance.   Musculoskeletal: Negative for myalgias.   Neurological: Negative for numbness.     Objective      Vitals:    08/09/21 1114   BP: 126/70   BP Location: Right arm   Patient Position: Sitting   Cuff Size: Small Adult   Pulse: 63   SpO2: 98%   Weight: 70.6 kg (155 lb 9.6 oz)   Height: 170.2 cm (67.01\")     Physical Exam  Constitutional:       General: She is not in acute distress.     Appearance: Normal appearance. She is not ill-appearing, toxic-appearing or diaphoretic.   Eyes:      General: No scleral icterus.        Right eye: No discharge.         Left eye: No discharge.      Extraocular Movements: Extraocular movements intact.      Conjunctiva/sclera: Conjunctivae normal.   Neck:      Vascular: No carotid bruit.   Cardiovascular:      Rate and Rhythm: Normal rate and regular rhythm.      Pulses: Normal pulses.      Heart sounds: Normal heart sounds.   Pulmonary:      Effort: Pulmonary effort is normal. No respiratory distress.      Breath sounds: Normal breath sounds. No stridor.   Chest:      Chest wall: No tenderness.   Abdominal:      General: Bowel sounds are normal. There is no distension.      Palpations: Abdomen is soft. There is no mass.      Tenderness: There is no right CVA tenderness or left CVA tenderness.   Musculoskeletal:         General: No swelling or tenderness. Normal range of motion.      Cervical back: Normal range of motion and neck supple.      Right lower leg: No edema.      Left " lower leg: No edema.   Lymphadenopathy:      Cervical: No cervical adenopathy.   Skin:     General: Skin is warm and dry.   Neurological:      Mental Status: She is alert and oriented to person, place, and time.   Psychiatric:         Mood and Affect: Mood normal.         Behavior: Behavior normal.       Office Visit on 02/05/2021   Component Date Value Ref Range Status   • Glucose 02/05/2021 96  65 - 99 mg/dL Final   • BUN 02/05/2021 13  8 - 27 mg/dL Final   • Creatinine 02/05/2021 0.75  0.57 - 1.00 mg/dL Final   • eGFR Non  Am 02/05/2021 85  >59 mL/min/1.73 Final   • eGFR African Am 02/05/2021 97  >59 mL/min/1.73 Final   • BUN/Creatinine Ratio 02/05/2021 17  12 - 28 Final   • Sodium 02/05/2021 142  134 - 144 mmol/L Final   • Potassium 02/05/2021 4.7  3.5 - 5.2 mmol/L Final   • Chloride 02/05/2021 101  96 - 106 mmol/L Final   • Total CO2 02/05/2021 27  20 - 29 mmol/L Final   • Calcium 02/05/2021 8.9  8.7 - 10.3 mg/dL Final   • Total Protein 02/05/2021 6.5  6.0 - 8.5 g/dL Final   • Albumin 02/05/2021 4.1  3.8 - 4.8 g/dL Final   • Globulin 02/05/2021 2.4  1.5 - 4.5 g/dL Final   • A/G Ratio 02/05/2021 1.7  1.2 - 2.2 Final   • Total Bilirubin 02/05/2021 0.4  0.0 - 1.2 mg/dL Final   • Alkaline Phosphatase 02/05/2021 75  39 - 117 IU/L Final   • AST (SGOT) 02/05/2021 16  0 - 40 IU/L Final   • ALT (SGPT) 02/05/2021 11  0 - 32 IU/L Final   • 25 Hydroxy, Vitamin D 02/05/2021 37.9  30.0 - 100.0 ng/mL Final    Comment: Vitamin D deficiency has been defined by the [place] of  [employer] and an Endocrine Society practice guideline as a  level of serum 25-OH vitamin D less than 20 ng/mL (1,2).  The Endocrine Society went on to further define vitamin D  insufficiency as a level between 21 and 29 ng/mL (2).  1. IOM ([employer] [employer]). 2010. Dietary reference     intakes for calcium and D. [place]: The     National Academies Press.  2. Brian GORE, Krissy SANDHU, Kalpesh CARNEY, et al.     Evaluation, treatment,  and prevention of vitamin D     deficiency: an Endocrine Society clinical practice     guideline. JCEM. 2011 Jul; 96(7):1911-30.     • Phosphorus 02/05/2021 4.4* 3.0 - 4.3 mg/dL Final   • Magnesium 02/05/2021 1.9  1.6 - 2.3 mg/dL Final   • Total Cholesterol 02/05/2021 183  100 - 199 mg/dL Final   • Triglycerides 02/05/2021 70  0 - 149 mg/dL Final   • HDL Cholesterol 02/05/2021 82  >39 mg/dL Final   • VLDL Cholesterol Srini 02/05/2021 13  5 - 40 mg/dL Final   • LDL Chol Calc ([facility]) 02/05/2021 88  0 - 99 mg/dL Final   • Interpretation 02/05/2021 Note   Final    Supplemental report is available.     Assessment/Plan   Diagnoses and all orders for this visit:    1. Hyperthyroidism (Primary)    2. Essential hypertension    3. Hepatic steatosis    4. Osteopenia of multiple sites    5. Multinodular thyroid      Continue methimazole 10 mg 1/2 tablet daily.  Check thyroid function tests.  Continue calcium and vitamin D supplements.  Continue Reclast yearly.  Schedule bone density after December 2021.    Copy my note sent to Trinidad Zhao NP    RTC 6 mos.

## 2021-08-09 ENCOUNTER — OFFICE VISIT (OUTPATIENT)
Dept: ENDOCRINOLOGY | Age: 66
End: 2021-08-09

## 2021-08-09 VITALS
WEIGHT: 155.6 LBS | DIASTOLIC BLOOD PRESSURE: 70 MMHG | OXYGEN SATURATION: 98 % | HEIGHT: 67 IN | HEART RATE: 63 BPM | BODY MASS INDEX: 24.42 KG/M2 | SYSTOLIC BLOOD PRESSURE: 126 MMHG

## 2021-08-09 DIAGNOSIS — I10 ESSENTIAL HYPERTENSION: ICD-10-CM

## 2021-08-09 DIAGNOSIS — E04.2 MULTINODULAR THYROID: ICD-10-CM

## 2021-08-09 DIAGNOSIS — E05.90 HYPERTHYROIDISM: Primary | ICD-10-CM

## 2021-08-09 DIAGNOSIS — K76.0 HEPATIC STEATOSIS: ICD-10-CM

## 2021-08-09 DIAGNOSIS — M85.89 OSTEOPENIA OF MULTIPLE SITES: ICD-10-CM

## 2021-08-09 PROCEDURE — 99214 OFFICE O/P EST MOD 30 MIN: CPT | Performed by: INTERNAL MEDICINE

## 2021-08-09 RX ORDER — LANOLIN ALCOHOL/MO/W.PET/CERES
1000 CREAM (GRAM) TOPICAL DAILY
COMMUNITY

## 2021-08-10 LAB
25(OH)D3+25(OH)D2 SERPL-MCNC: 43.6 NG/ML (ref 30–100)
ALBUMIN SERPL-MCNC: 4.2 G/DL (ref 3.5–5.2)
ALBUMIN/GLOB SERPL: 1.8 G/DL
ALP SERPL-CCNC: 80 U/L (ref 39–117)
ALT SERPL-CCNC: 16 U/L (ref 1–33)
AST SERPL-CCNC: 21 U/L (ref 1–32)
BILIRUB SERPL-MCNC: 0.2 MG/DL (ref 0–1.2)
BUN SERPL-MCNC: 8 MG/DL (ref 8–23)
BUN/CREAT SERPL: 11.6 (ref 7–25)
CALCIUM SERPL-MCNC: 8.4 MG/DL (ref 8.6–10.5)
CHLORIDE SERPL-SCNC: 99 MMOL/L (ref 98–107)
CHOLEST SERPL-MCNC: 188 MG/DL (ref 0–200)
CO2 SERPL-SCNC: 31.4 MMOL/L (ref 22–29)
CREAT SERPL-MCNC: 0.69 MG/DL (ref 0.57–1)
GLOBULIN SER CALC-MCNC: 2.3 GM/DL
GLUCOSE SERPL-MCNC: 91 MG/DL (ref 65–99)
HDLC SERPL-MCNC: 95 MG/DL (ref 40–60)
IMP & REVIEW OF LAB RESULTS: NORMAL
LDLC SERPL CALC-MCNC: 84 MG/DL (ref 0–100)
POTASSIUM SERPL-SCNC: 4.9 MMOL/L (ref 3.5–5.2)
PROT SERPL-MCNC: 6.5 G/DL (ref 6–8.5)
SODIUM SERPL-SCNC: 139 MMOL/L (ref 136–145)
T4 FREE SERPL-MCNC: 1.11 NG/DL (ref 0.93–1.7)
TRIGL SERPL-MCNC: 44 MG/DL (ref 0–150)
TSH SERPL DL<=0.005 MIU/L-ACNC: 1.22 UIU/ML (ref 0.27–4.2)
VLDLC SERPL CALC-MCNC: 9 MG/DL (ref 5–40)

## 2021-08-21 NOTE — PROGRESS NOTES
Normal thyroid function test.  Continue Tapazole 10 mg 1/2 tablet/day.LDL 84.  HDL 95.  LDL and HDL okay.Normal vitamin D.  Continue vitamin D3 1000 units/day.Send copy of labs to Trinidad Hameed NP.Copy of labs sent to patient through my chart.

## 2021-11-24 RX ORDER — METHIMAZOLE 5 MG/1
TABLET ORAL
Qty: 90 TABLET | Refills: 1 | Status: SHIPPED | OUTPATIENT
Start: 2021-11-24 | End: 2022-03-30

## 2022-02-16 DIAGNOSIS — E05.90 HYPERTHYROIDISM: ICD-10-CM

## 2022-02-16 DIAGNOSIS — E04.2 MULTINODULAR THYROID: ICD-10-CM

## 2022-02-16 DIAGNOSIS — R79.9 ABNORMAL FINDING OF BLOOD CHEMISTRY, UNSPECIFIED: ICD-10-CM

## 2022-02-16 DIAGNOSIS — R68.89 ABNORMAL ENDOCRINE LABORATORY TEST FINDING: ICD-10-CM

## 2022-02-16 DIAGNOSIS — I10 ESSENTIAL HYPERTENSION: Primary | ICD-10-CM

## 2022-03-03 ENCOUNTER — OFFICE VISIT (OUTPATIENT)
Dept: ENDOCRINOLOGY | Age: 67
End: 2022-03-03

## 2022-03-03 VITALS
SYSTOLIC BLOOD PRESSURE: 122 MMHG | WEIGHT: 149.8 LBS | OXYGEN SATURATION: 98 % | HEIGHT: 67 IN | HEART RATE: 72 BPM | DIASTOLIC BLOOD PRESSURE: 69 MMHG | BODY MASS INDEX: 23.51 KG/M2

## 2022-03-03 DIAGNOSIS — K76.0 HEPATIC STEATOSIS: ICD-10-CM

## 2022-03-03 DIAGNOSIS — M85.89 OSTEOPENIA OF MULTIPLE SITES: ICD-10-CM

## 2022-03-03 DIAGNOSIS — E04.2 MULTINODULAR THYROID: ICD-10-CM

## 2022-03-03 DIAGNOSIS — Z83.3 FAMILY HISTORY OF DIABETES MELLITUS (DM): ICD-10-CM

## 2022-03-03 DIAGNOSIS — E05.90 HYPERTHYROIDISM: Primary | ICD-10-CM

## 2022-03-03 PROCEDURE — 99214 OFFICE O/P EST MOD 30 MIN: CPT | Performed by: INTERNAL MEDICINE

## 2022-03-04 DIAGNOSIS — N95.1 SYMPTOMATIC MENOPAUSAL OR FEMALE CLIMACTERIC STATES: ICD-10-CM

## 2022-03-04 DIAGNOSIS — M85.89 OSTEOPENIA OF MULTIPLE SITES: Primary | ICD-10-CM

## 2022-03-04 LAB
25(OH)D3+25(OH)D2 SERPL-MCNC: 41.2 NG/ML (ref 30–100)
ALBUMIN SERPL-MCNC: 4.1 G/DL (ref 3.8–4.8)
ALBUMIN/GLOB SERPL: 1.6 {RATIO} (ref 1.2–2.2)
ALP SERPL-CCNC: 70 IU/L (ref 44–121)
ALT SERPL-CCNC: 14 IU/L (ref 0–32)
AST SERPL-CCNC: 20 IU/L (ref 0–40)
BASOPHILS # BLD AUTO: 0 X10E3/UL (ref 0–0.2)
BASOPHILS NFR BLD AUTO: 1 %
BILIRUB SERPL-MCNC: 0.3 MG/DL (ref 0–1.2)
BUN SERPL-MCNC: 12 MG/DL (ref 8–27)
BUN/CREAT SERPL: 16 (ref 12–28)
CALCIUM SERPL-MCNC: 8.4 MG/DL (ref 8.7–10.3)
CHLORIDE SERPL-SCNC: 99 MMOL/L (ref 96–106)
CO2 SERPL-SCNC: 25 MMOL/L (ref 20–29)
CREAT SERPL-MCNC: 0.73 MG/DL (ref 0.57–1)
EGFR GENE MUT ANL BLD/T: 91 ML/MIN/1.73
EOSINOPHIL # BLD AUTO: 0.1 X10E3/UL (ref 0–0.4)
EOSINOPHIL NFR BLD AUTO: 2 %
ERYTHROCYTE [DISTWIDTH] IN BLOOD BY AUTOMATED COUNT: 12.4 % (ref 11.7–15.4)
GLOBULIN SER CALC-MCNC: 2.5 G/DL (ref 1.5–4.5)
GLUCOSE SERPL-MCNC: 91 MG/DL (ref 65–99)
HBA1C MFR BLD: 5 % (ref 4.8–5.6)
HCT VFR BLD AUTO: 37.9 % (ref 34–46.6)
HGB BLD-MCNC: 12.5 G/DL (ref 11.1–15.9)
IMM GRANULOCYTES # BLD AUTO: 0 X10E3/UL (ref 0–0.1)
IMM GRANULOCYTES NFR BLD AUTO: 0 %
LYMPHOCYTES # BLD AUTO: 1.6 X10E3/UL (ref 0.7–3.1)
LYMPHOCYTES NFR BLD AUTO: 41 %
MCH RBC QN AUTO: 30.9 PG (ref 26.6–33)
MCHC RBC AUTO-ENTMCNC: 33 G/DL (ref 31.5–35.7)
MCV RBC AUTO: 94 FL (ref 79–97)
MONOCYTES # BLD AUTO: 0.4 X10E3/UL (ref 0.1–0.9)
MONOCYTES NFR BLD AUTO: 10 %
NEUTROPHILS # BLD AUTO: 1.8 X10E3/UL (ref 1.4–7)
NEUTROPHILS NFR BLD AUTO: 46 %
PLATELET # BLD AUTO: 282 X10E3/UL (ref 150–450)
POTASSIUM SERPL-SCNC: 4.9 MMOL/L (ref 3.5–5.2)
PROT SERPL-MCNC: 6.6 G/DL (ref 6–8.5)
RBC # BLD AUTO: 4.05 X10E6/UL (ref 3.77–5.28)
REPORT: NORMAL
SODIUM SERPL-SCNC: 140 MMOL/L (ref 134–144)
T4 FREE SERPL-MCNC: 1.24 NG/DL (ref 0.82–1.77)
TSH SERPL DL<=0.005 MIU/L-ACNC: 0.94 UIU/ML (ref 0.45–4.5)
WBC # BLD AUTO: 4 X10E3/UL (ref 3.4–10.8)

## 2022-03-04 RX ORDER — SODIUM CHLORIDE 9 MG/ML
250 INJECTION, SOLUTION INTRAVENOUS ONCE
OUTPATIENT
Start: 2022-03-04

## 2022-03-04 RX ORDER — ZOLEDRONIC ACID 5 MG/100ML
5 INJECTION, SOLUTION INTRAVENOUS ONCE
Status: CANCELLED | OUTPATIENT
Start: 2022-03-04

## 2022-03-04 RX ORDER — SODIUM CHLORIDE 9 MG/ML
250 INJECTION, SOLUTION INTRAVENOUS ONCE
Status: CANCELLED | OUTPATIENT
Start: 2022-03-04

## 2022-03-08 NOTE — PROGRESS NOTES
Normal thyroid function tests.  Continue Tapazole 10 mg 1/2 tablet daily.  Normal vitamin D.  Continue vitamin D3 1000 units/day.  Hemoglobin A1c 5.0%.  Hemoglobin A1c is in nondiabetic range.  Normal CBC.  Send copy of labs to Trinidad Zhao NP.  Copy of labs sent to patient through [employer].

## 2022-03-16 ENCOUNTER — HOSPITAL ENCOUNTER (OUTPATIENT)
Dept: INFUSION THERAPY | Facility: HOSPITAL | Age: 67
Discharge: HOME | End: 2022-03-16
Admitting: INTERNAL MEDICINE

## 2022-03-16 VITALS
SYSTOLIC BLOOD PRESSURE: 121 MMHG | RESPIRATION RATE: 20 BRPM | BODY MASS INDEX: 23.33 KG/M2 | OXYGEN SATURATION: 97 % | HEART RATE: 71 BPM | WEIGHT: 149 LBS | DIASTOLIC BLOOD PRESSURE: 73 MMHG | TEMPERATURE: 98.4 F

## 2022-03-16 DIAGNOSIS — N95.1 SYMPTOMATIC MENOPAUSAL OR FEMALE CLIMACTERIC STATES: Primary | ICD-10-CM

## 2022-03-16 DIAGNOSIS — M85.89 OSTEOPENIA OF MULTIPLE SITES: ICD-10-CM

## 2022-03-16 LAB
CA-I BLD-MCNC: 4.8 MG/DL (ref 4.6–5.4)
CA-I SERPL ISE-MCNC: 1.19 MMOL/L (ref 1.15–1.35)

## 2022-03-16 PROCEDURE — 36415 COLL VENOUS BLD VENIPUNCTURE: CPT

## 2022-03-16 PROCEDURE — 82330 ASSAY OF CALCIUM: CPT | Performed by: INTERNAL MEDICINE

## 2022-03-16 PROCEDURE — 25010000002 ZOLEDRONIC ACID 5 MG/100ML SOLUTION: Performed by: INTERNAL MEDICINE

## 2022-03-16 PROCEDURE — 96365 THER/PROPH/DIAG IV INF INIT: CPT

## 2022-03-16 RX ORDER — ZOLEDRONIC ACID 5 MG/100ML
5 INJECTION, SOLUTION INTRAVENOUS ONCE
Status: CANCELLED | OUTPATIENT
Start: 2023-03-16

## 2022-03-16 RX ORDER — SODIUM CHLORIDE 9 MG/ML
250 INJECTION, SOLUTION INTRAVENOUS ONCE
OUTPATIENT
Start: 2023-03-16

## 2022-03-16 RX ORDER — ZOLEDRONIC ACID 5 MG/100ML
5 INJECTION, SOLUTION INTRAVENOUS ONCE
Status: COMPLETED | OUTPATIENT
Start: 2022-03-16 | End: 2022-03-16

## 2022-03-16 RX ADMIN — ZOLEDRONIC ACID 5 MG: 0.05 INJECTION, SOLUTION INTRAVENOUS at 11:51

## 2022-03-20 NOTE — PROGRESS NOTES
Normal ionized calcium.  Send copy of labs to Trinidad Zhao NP.    Copy of labs sent to patient through [employer]

## 2022-03-30 RX ORDER — METHIMAZOLE 5 MG/1
TABLET ORAL
Qty: 90 TABLET | Refills: 1 | Status: SHIPPED | OUTPATIENT
Start: 2022-03-30 | End: 2022-10-05

## 2022-08-10 DIAGNOSIS — E04.2 MULTINODULAR THYROID: ICD-10-CM

## 2022-08-10 DIAGNOSIS — Z83.3 FAMILY HISTORY OF DIABETES MELLITUS (DM): ICD-10-CM

## 2022-08-10 DIAGNOSIS — E05.90 HYPERTHYROIDISM: Primary | ICD-10-CM

## 2022-08-10 DIAGNOSIS — I10 ESSENTIAL HYPERTENSION: ICD-10-CM

## 2022-08-10 DIAGNOSIS — E55.9 VITAMIN D DEFICIENCY: ICD-10-CM

## 2022-08-15 ENCOUNTER — LAB (OUTPATIENT)
Dept: ENDOCRINOLOGY | Age: 67
End: 2022-08-15

## 2022-08-15 DIAGNOSIS — E04.2 MULTINODULAR THYROID: ICD-10-CM

## 2022-08-15 DIAGNOSIS — E05.90 HYPERTHYROIDISM: ICD-10-CM

## 2022-08-15 DIAGNOSIS — M85.89 OSTEOPENIA OF MULTIPLE SITES: ICD-10-CM

## 2022-08-15 DIAGNOSIS — N95.1 SYMPTOMATIC MENOPAUSAL OR FEMALE CLIMACTERIC STATES: ICD-10-CM

## 2022-08-15 DIAGNOSIS — Z83.3 FAMILY HISTORY OF DIABETES MELLITUS (DM): ICD-10-CM

## 2022-08-15 DIAGNOSIS — E55.9 VITAMIN D DEFICIENCY: ICD-10-CM

## 2022-08-16 LAB
25(OH)D3+25(OH)D2 SERPL-MCNC: 44.7 NG/ML (ref 30–100)
ALBUMIN SERPL-MCNC: 4.1 G/DL (ref 3.8–4.8)
ALBUMIN/GLOB SERPL: 2 {RATIO} (ref 1.2–2.2)
ALP SERPL-CCNC: 72 IU/L (ref 44–121)
ALT SERPL-CCNC: 15 IU/L (ref 0–32)
AST SERPL-CCNC: 20 IU/L (ref 0–40)
BASOPHILS # BLD AUTO: 0 X10E3/UL (ref 0–0.2)
BASOPHILS NFR BLD AUTO: 1 %
BILIRUB SERPL-MCNC: 0.2 MG/DL (ref 0–1.2)
BUN SERPL-MCNC: 5 MG/DL (ref 8–27)
BUN/CREAT SERPL: 7 (ref 12–28)
CALCIUM SERPL-MCNC: 8 MG/DL (ref 8.7–10.3)
CHLORIDE SERPL-SCNC: 96 MMOL/L (ref 96–106)
CO2 SERPL-SCNC: 28 MMOL/L (ref 20–29)
CREAT SERPL-MCNC: 0.71 MG/DL (ref 0.57–1)
EGFRCR-CYS SERPLBLD CKD-EPI 2021: 94 ML/MIN/1.73
EOSINOPHIL # BLD AUTO: 0.2 X10E3/UL (ref 0–0.4)
EOSINOPHIL NFR BLD AUTO: 3 %
ERYTHROCYTE [DISTWIDTH] IN BLOOD BY AUTOMATED COUNT: 12.7 % (ref 11.7–15.4)
GLOBULIN SER CALC-MCNC: 2.1 G/DL (ref 1.5–4.5)
GLUCOSE SERPL-MCNC: 92 MG/DL (ref 65–99)
HBA1C MFR BLD: 5.3 % (ref 4.8–5.6)
HCT VFR BLD AUTO: 38.5 % (ref 34–46.6)
HGB BLD-MCNC: 12.7 G/DL (ref 11.1–15.9)
IMM GRANULOCYTES # BLD AUTO: 0 X10E3/UL (ref 0–0.1)
IMM GRANULOCYTES NFR BLD AUTO: 0 %
LYMPHOCYTES # BLD AUTO: 2 X10E3/UL (ref 0.7–3.1)
LYMPHOCYTES NFR BLD AUTO: 44 %
MCH RBC QN AUTO: 30.2 PG (ref 26.6–33)
MCHC RBC AUTO-ENTMCNC: 33 G/DL (ref 31.5–35.7)
MCV RBC AUTO: 91 FL (ref 79–97)
MONOCYTES # BLD AUTO: 0.4 X10E3/UL (ref 0.1–0.9)
MONOCYTES NFR BLD AUTO: 9 %
NEUTROPHILS # BLD AUTO: 2 X10E3/UL (ref 1.4–7)
NEUTROPHILS NFR BLD AUTO: 43 %
PLATELET # BLD AUTO: 290 X10E3/UL (ref 150–450)
POTASSIUM SERPL-SCNC: 4.4 MMOL/L (ref 3.5–5.2)
PROT SERPL-MCNC: 6.2 G/DL (ref 6–8.5)
RBC # BLD AUTO: 4.21 X10E6/UL (ref 3.77–5.28)
SODIUM SERPL-SCNC: 136 MMOL/L (ref 134–144)
T4 FREE SERPL-MCNC: 1.03 NG/DL (ref 0.82–1.77)
TSH SERPL DL<=0.005 MIU/L-ACNC: 0.68 UIU/ML (ref 0.45–4.5)
WBC # BLD AUTO: 4.5 X10E3/UL (ref 3.4–10.8)

## 2022-08-18 DIAGNOSIS — M85.89 OSTEOPENIA OF MULTIPLE SITES: Primary | ICD-10-CM

## 2022-08-18 RX ORDER — CALCIUM CARBONATE 200(500)MG
1 TABLET,CHEWABLE ORAL 2 TIMES DAILY
Qty: 90 TABLET | Refills: 3
Start: 2022-08-18 | End: 2023-03-21

## 2022-08-18 NOTE — PROGRESS NOTES
Calcium 8.0 mg per DL.  Calcium slightly low.  Start Tums 500 mg twice daily.  Repeat BMP in 2 weeks.  Orders placed in chart.  Normal vitamin D.  Continue vitamin D3 1000 units/day.  Normal TSH.  Normal free T4.  Continue methimazole 5 mg/day.  Hemoglobin A1c 5.2%.  Hemoglobin A1c is in nondiabetic range.  Normal CBC.

## 2022-10-05 RX ORDER — METHIMAZOLE 5 MG/1
TABLET ORAL
Qty: 90 TABLET | Refills: 1 | Status: SHIPPED | OUTPATIENT
Start: 2022-10-05 | End: 2023-03-23

## 2023-01-19 NOTE — PROGRESS NOTES
Follow-up inguinal hernia repair    Subjective:  Patient states that initially she didn't have much pain and discomfort, and she is now about 6 weeks out, but over the past 2-3 weeks she has had some right groin pain which seems to radiate into her right hip.  Certain movements seem to worsen this.  Sometimes it feels normal.    Objective:  Gen.: Awake alert and oriented no acute distress  Abdomen: Soft and benign, incisions well-healed  Right groin without signs of recurrent hernia recurrence.  No significant reproduction of pain with palpation.    Assessment and plan:  Postoperative right groin pain.  Does not sound like typical nerve entrapment.  I have recommended that she try gabapentin.  She seems to have a tendency towards pain disorders, and I'm hopeful that this will improve with some time.  We will see how she is doing in about a month's time and if her pain seems to doing better we will discussed the pluses and minuses of staying on Neurontin longer term.  I'm hopeful that this will simply improve on its own.    Han Torres MD  General and Endoscopic Surgery  [facility]    [address] [place] [address], [address]  [place], [place], [address]  P: [phone]  F: [phone]      
[de-identified] : Left wrist radiographs were obtained and independently reviewed during today's visit. Previously noted radius and ulna fractures are well healed. The radius fracture is in approximately 20 degrees of dorsal angulation - unchanged. There is abundant callus formation noted. The ulna remains in anatomic alignment. Physes remain open and intact.

## 2023-03-07 ENCOUNTER — TELEPHONE (OUTPATIENT)
Dept: ENDOCRINOLOGY | Age: 68
End: 2023-03-07
Payer: MEDICARE

## 2023-03-07 NOTE — TELEPHONE ENCOUNTER
Patient has not been seen since March 3, 2022.  She canceled appointments on August 10, 2022 and January 12, 2023.  She needs to be seen before ordering the Reclast.  Please schedule appointment with KARLENE Haas NP in the near future.

## 2023-03-21 ENCOUNTER — OFFICE VISIT (OUTPATIENT)
Dept: ENDOCRINOLOGY | Age: 68
End: 2023-03-21
Payer: MEDICARE

## 2023-03-21 VITALS
DIASTOLIC BLOOD PRESSURE: 84 MMHG | BODY MASS INDEX: 21.46 KG/M2 | HEIGHT: 68 IN | HEART RATE: 66 BPM | OXYGEN SATURATION: 97 % | SYSTOLIC BLOOD PRESSURE: 130 MMHG | TEMPERATURE: 98 F | WEIGHT: 141.6 LBS

## 2023-03-21 DIAGNOSIS — M85.89 OSTEOPENIA OF MULTIPLE SITES: ICD-10-CM

## 2023-03-21 DIAGNOSIS — E05.90 HYPERTHYROIDISM: Primary | ICD-10-CM

## 2023-03-21 PROCEDURE — 3079F DIAST BP 80-89 MM HG: CPT | Performed by: NURSE PRACTITIONER

## 2023-03-21 PROCEDURE — 3075F SYST BP GE 130 - 139MM HG: CPT | Performed by: NURSE PRACTITIONER

## 2023-03-21 PROCEDURE — 99214 OFFICE O/P EST MOD 30 MIN: CPT | Performed by: NURSE PRACTITIONER

## 2023-03-21 RX ORDER — MIRABEGRON 50 MG/1
TABLET, FILM COATED, EXTENDED RELEASE ORAL
COMMUNITY
Start: 2023-01-05

## 2023-03-21 RX ORDER — TRAZODONE HYDROCHLORIDE 50 MG/1
TABLET ORAL
COMMUNITY
Start: 2023-01-15

## 2023-03-21 RX ORDER — IBUPROFEN 800 MG/1
TABLET ORAL
COMMUNITY
Start: 2023-03-08

## 2023-03-21 RX ORDER — OMEPRAZOLE 20 MG/1
CAPSULE, DELAYED RELEASE ORAL
COMMUNITY
Start: 2023-01-18

## 2023-03-21 NOTE — PROGRESS NOTES
Chief Complaint  Chief Complaint   Patient presents with   • Hyperthyroidism     Pt states that energy levels have been low, weight is stable, does have family hx of thyroid disease(cousins).        Subjective          History of Present Illness    Megan Ratliff 67 y.o. presents for a follow-up evaluation of Hypothyroidism due to Hashimotos/Hyperthyroidism due to Grave's Disease      In August 2019,  she was seen by Dr. Young because of a 40 pound weight loss since April 2019.  Work-up done in September 2019 showed an elevated free T4, elevated TSI and elevated TPO.  Thyroid ultrasound done in September 2019 showed slightly heterogeneous diffuse echotexture and multiple subcentimeter sized solid thyroid nodules bilaterally without dominant or grossly suspicious nodule.    Last thyroid US in 04/22 showed Mildly heterogeneous but otherwise normal thyroid gland      Pt complains of hair loss, palpitations with anxiety and trouble swallowing - history of esophagus stretching and follow with GI    Denies insomnia, tremors, weight changes, constipation, diarrhea, chest pain, shortness of breath, heat intolerance, cold intolerance or change in voice.    Weight loss of 8 lbs since last visit.    She had a hysterectomy with one oophorectomy at age 34 for endometriosis.    She was never on hormone replacement therapy      Current treatment is methimazole 10 mg 1/2 tablet daily    Last labs in 08/22 showed TSH 0.683 and FT4 1.03            Osteoporosis    Current treatment includes Reclast annually and Tums 500 mg twice daily and Vitamin D 1,000 units daily  She had her first Reclast infusion on January 9, 2020  Her second dose of Reclast was in February 2021 without major side effects  Third dose was in 03/22    Last Ca was 8.0 in 08/22    Last Vitamin D was 44.7 in 08/22    Last DEXA scan was in 03/22 and showed Osteopenia              Other History    She had lumpectomy of the right breast for cancer in 2005 followed by  "radiation therapy and chemotherapy.        She had an EGD and esophageal dilatation done by Dr. Ureña in December 2019        I have reviewed the patient's allergies, medicines, past medical hx, family hx and social hx.    Objective   Vital Signs:   /84   Pulse 66   Temp 98 °F (36.7 °C) (Temporal)   Ht 171.5 cm (67.52\")   Wt 64.2 kg (141 lb 9.6 oz)   SpO2 97%   BMI 21.84 kg/m²       Physical Exam   Physical Exam  Constitutional:       General: She is not in acute distress.     Appearance: Normal appearance. She is not ill-appearing.   HENT:      Head: Normocephalic and atraumatic.   Eyes:      General:         Right eye: No discharge.         Left eye: No discharge.   Neck:      Thyroid: No thyroid mass, thyromegaly or thyroid tenderness.      Trachea: Trachea normal.   Cardiovascular:      Rate and Rhythm: Normal rate and regular rhythm.      Heart sounds: Normal heart sounds. No murmur heard.    No friction rub. No gallop.   Pulmonary:      Effort: Pulmonary effort is normal. No respiratory distress.      Breath sounds: Normal breath sounds. No wheezing.   Musculoskeletal:      Cervical back: Neck supple.   Skin:     General: Skin is warm and dry.   Neurological:      Mental Status: She is alert.   Psychiatric:         Mood and Affect: Mood normal.         Behavior: Behavior normal.                    Results Review:   TSH   Date Value Ref Range Status   08/15/2022 0.683 0.450 - 4.500 uIU/mL Final   07/19/2019 <0.005 (L) 0.270 - 4.200 mIU/mL Final     T3, Free   Date Value Ref Range Status   01/22/2021 2.5 2.0 - 4.4 pg/mL Final   08/09/2019 11.60 (H) 2.00 - 4.40 pg/mL Final         Assessment and Plan    Diagnoses and all orders for this visit:    1. Hyperthyroidism (Primary)  -     Comprehensive Metabolic Panel  -     TSH  -     T4, Free  -     T3, Free  -     Vitamin B12  -     CBC Auto Differential    Check labs today  Continue with  methimazole 10 mg 1/2 tablet daily  Pt is concerned about hair " loss - check labs, but patient also states that her mother had issues with thinning hair      2. Osteopenia of multiple sites  -     Comprehensive Metabolic Panel  -     Vitamin D,25-Hydroxy    Check labs and if everything is okay then will need fourth dose of Reclast        Labs today  RTC on 6/16/23 with Dr. Maldonado      Follow Up     Patient was given instructions and counseling regarding her condition or for health maintenance advice. Please see specific information pulled into the AVS if appropriate.              Yue Haas, YODIT  03/21/23

## 2023-03-22 DIAGNOSIS — E83.51 HYPOCALCEMIA: ICD-10-CM

## 2023-03-22 DIAGNOSIS — M85.89 OSTEOPENIA OF MULTIPLE SITES: Primary | ICD-10-CM

## 2023-03-22 LAB
25(OH)D3+25(OH)D2 SERPL-MCNC: 56.2 NG/ML (ref 30–100)
ALBUMIN SERPL-MCNC: 4.2 G/DL (ref 3.5–5.2)
ALBUMIN/GLOB SERPL: 2 G/DL
ALP SERPL-CCNC: 75 U/L (ref 39–117)
ALT SERPL-CCNC: 16 U/L (ref 1–33)
AST SERPL-CCNC: 22 U/L (ref 1–32)
BASOPHILS # BLD AUTO: 0.03 10*3/MM3 (ref 0–0.2)
BASOPHILS NFR BLD AUTO: 0.7 % (ref 0–1.5)
BILIRUB SERPL-MCNC: 0.4 MG/DL (ref 0–1.2)
BUN SERPL-MCNC: 6 MG/DL (ref 8–23)
BUN/CREAT SERPL: 8.8 (ref 7–25)
CALCIUM SERPL-MCNC: 8.5 MG/DL (ref 8.6–10.5)
CHLORIDE SERPL-SCNC: 98 MMOL/L (ref 98–107)
CO2 SERPL-SCNC: 28.5 MMOL/L (ref 22–29)
CREAT SERPL-MCNC: 0.68 MG/DL (ref 0.57–1)
EGFRCR SERPLBLD CKD-EPI 2021: 96.2 ML/MIN/1.73
EOSINOPHIL # BLD AUTO: 0.06 10*3/MM3 (ref 0–0.4)
EOSINOPHIL NFR BLD AUTO: 1.5 % (ref 0.3–6.2)
ERYTHROCYTE [DISTWIDTH] IN BLOOD BY AUTOMATED COUNT: 13 % (ref 12.3–15.4)
GLOBULIN SER CALC-MCNC: 2.1 GM/DL
GLUCOSE SERPL-MCNC: 96 MG/DL (ref 65–99)
HCT VFR BLD AUTO: 35.4 % (ref 34–46.6)
HGB BLD-MCNC: 12 G/DL (ref 12–15.9)
IMM GRANULOCYTES # BLD AUTO: 0 10*3/MM3 (ref 0–0.05)
IMM GRANULOCYTES NFR BLD AUTO: 0 % (ref 0–0.5)
LYMPHOCYTES # BLD AUTO: 1.78 10*3/MM3 (ref 0.7–3.1)
LYMPHOCYTES NFR BLD AUTO: 44.2 % (ref 19.6–45.3)
MCH RBC QN AUTO: 31.4 PG (ref 26.6–33)
MCHC RBC AUTO-ENTMCNC: 33.9 G/DL (ref 31.5–35.7)
MCV RBC AUTO: 92.7 FL (ref 79–97)
MONOCYTES # BLD AUTO: 0.3 10*3/MM3 (ref 0.1–0.9)
MONOCYTES NFR BLD AUTO: 7.4 % (ref 5–12)
NEUTROPHILS # BLD AUTO: 1.86 10*3/MM3 (ref 1.7–7)
NEUTROPHILS NFR BLD AUTO: 46.2 % (ref 42.7–76)
NRBC BLD AUTO-RTO: 0 /100 WBC (ref 0–0.2)
PLATELET # BLD AUTO: 309 10*3/MM3 (ref 140–450)
POTASSIUM SERPL-SCNC: 4.6 MMOL/L (ref 3.5–5.2)
PROT SERPL-MCNC: 6.3 G/DL (ref 6–8.5)
RBC # BLD AUTO: 3.82 10*6/MM3 (ref 3.77–5.28)
SODIUM SERPL-SCNC: 137 MMOL/L (ref 136–145)
T3FREE SERPL-MCNC: 3 PG/ML (ref 2–4.4)
T4 FREE SERPL-MCNC: 1.13 NG/DL (ref 0.93–1.7)
TSH SERPL DL<=0.005 MIU/L-ACNC: 1.37 UIU/ML (ref 0.27–4.2)
VIT B12 SERPL-MCNC: 857 PG/ML (ref 211–946)
WBC # BLD AUTO: 4.03 10*3/MM3 (ref 3.4–10.8)

## 2023-03-22 RX ORDER — CALCIUM CARBONATE 200(500)MG
1 TABLET,CHEWABLE ORAL DAILY
Qty: 30 TABLET | Refills: 0
Start: 2023-03-22

## 2023-03-23 RX ORDER — METHIMAZOLE 5 MG/1
TABLET ORAL
Qty: 90 TABLET | Refills: 1 | Status: SHIPPED | OUTPATIENT
Start: 2023-03-23

## 2023-04-26 DIAGNOSIS — E83.51 HYPOCALCEMIA: ICD-10-CM

## 2023-04-26 RX ORDER — CALCIUM CARBONATE 200(500)MG
2 TABLET,CHEWABLE ORAL DAILY
Qty: 180 TABLET | Refills: 1
Start: 2023-04-26

## 2023-06-16 ENCOUNTER — OFFICE VISIT (OUTPATIENT)
Dept: ENDOCRINOLOGY | Age: 68
End: 2023-06-16
Payer: MEDICARE

## 2023-06-16 VITALS
OXYGEN SATURATION: 95 % | SYSTOLIC BLOOD PRESSURE: 122 MMHG | TEMPERATURE: 97.5 F | DIASTOLIC BLOOD PRESSURE: 72 MMHG | BODY MASS INDEX: 21.19 KG/M2 | HEIGHT: 68 IN | WEIGHT: 139.8 LBS | HEART RATE: 63 BPM

## 2023-06-16 DIAGNOSIS — M85.89 OSTEOPENIA OF MULTIPLE SITES: ICD-10-CM

## 2023-06-16 DIAGNOSIS — E55.9 VITAMIN D DEFICIENCY: ICD-10-CM

## 2023-06-16 DIAGNOSIS — E05.90 HYPERTHYROIDISM: Primary | ICD-10-CM

## 2023-06-16 PROCEDURE — 99214 OFFICE O/P EST MOD 30 MIN: CPT | Performed by: INTERNAL MEDICINE

## 2023-06-16 PROCEDURE — 3074F SYST BP LT 130 MM HG: CPT | Performed by: INTERNAL MEDICINE

## 2023-06-16 PROCEDURE — 3078F DIAST BP <80 MM HG: CPT | Performed by: INTERNAL MEDICINE

## 2023-06-16 RX ORDER — DOCUSATE SODIUM 100 MG/1
100 CAPSULE, LIQUID FILLED ORAL 2 TIMES DAILY
COMMUNITY

## 2023-06-16 NOTE — PROGRESS NOTES
Subjective   Megan Ratliff is a 67 y.o. female.     History of Present Illness       Patient is a 67-year-old female who came in for follow-up.     In 2019,  she was seen by Dr. Young because of a 40 pound weight loss since 2019.  Work-up done in 2019 showed an elevated free T4 at 1.95 ng per DL, elevated thyroid-stimulating immunoglobulin at 4.13 international units/L and elevated thyroid peroxidase antibody at 288 international units/mL.  Thyroid ultrasound done in 2019 showed slightly heterogeneous diffuse echotexture and multiple subcentimeter sized solid thyroid nodules bilaterally without dominant or grossly suspicious nodule.     She is on Tapazole 10 mg 1/2 tab/day.  She has intentionally lost 10 pounds since since 3/22.  She denies heat or cold intolerance.  She denies bowel changes.  She denies palpitations or tremors.       CT scan shows diffuse hepatic steatosis.  Both parents have diabetes mellitus.  Hemoglobin A1c done in May 2021 is 5.2%.  Her last meal was last night.     She had an EGD and esophageal dilatation done by Dr. Ureña in 2019.  Colonoscopy done in 2019 showed hemorrhoids.      She is  1 para 2.  She had a hysterectomy with one oophorectomy at age 34 for endometriosis.  She was never on hormone replacement therapy.  Bone density done at [place] on December 15, 2019 showed osteopenia.  Ten-year risk of major osteoporotic fracture is elevated 25%. She had her first Reclast infusion on 2020.  She had no side effects.  She is on vit D3 1000 units/day, calcium  + D 1200 mg BID and Tums 1 tab/day.      Her second dose of Reclast was in 3/22 without major side effects.    Bone density done at [place] in 2022 showed osteopenia.  Bone density of the lumbar spine has increased by 10.1% since 2019.  Bone density of the right hip has increased 5% since 2019.  Bone density of the left hip has increased by 3.6%.     She had  "lumpectomy of the right breast for cancer in 2005 followed by radiation therapy and chemotherapy.  She has no known recurrence since and has regular mammograms done at [facility].  She saw Dr. Newton in 2019.  She is beng monitored by PCP.       The following portions of the patient's history were reviewed and updated as appropriate: allergies, current medications, past family history, past medical history, past social history, past surgical history, and problem list.    Review of Systems   Eyes:  Negative for visual disturbance.   Respiratory:  Negative for shortness of breath.    Cardiovascular:  Negative for chest pain and palpitations.   Gastrointestinal: Negative.    Endocrine: Negative for cold intolerance and heat intolerance.   Genitourinary: Negative.    Musculoskeletal:  Negative for myalgias.   Neurological: Negative.    Vitals:    06/16/23 1213   BP: 122/72   Pulse: 63   Temp: 97.5 °F (36.4 °C)   TempSrc: Temporal   SpO2: 95%   Weight: 63.4 kg (139 lb 12.8 oz)   Height: 171.5 cm (67.52\")      Objective   Physical Exam  Constitutional:       General: She is not in acute distress.     Appearance: Normal appearance. She is not ill-appearing or toxic-appearing.   Eyes:      General: No scleral icterus.        Right eye: No discharge.         Left eye: No discharge.      Extraocular Movements: Extraocular movements intact.   Neck:      Vascular: No carotid bruit.   Cardiovascular:      Rate and Rhythm: Normal rate and regular rhythm.      Pulses: Normal pulses.      Heart sounds: Normal heart sounds. No murmur heard.    No friction rub. No gallop.   Pulmonary:      Effort: No respiratory distress.      Breath sounds: Normal breath sounds. No rales.   Chest:      Chest wall: No tenderness.   Abdominal:      General: Bowel sounds are normal.      Palpations: Abdomen is soft.      Tenderness: There is no right CVA tenderness or left CVA tenderness.   Musculoskeletal:      Right lower leg: No edema.    "   Left lower leg: No edema.   Lymphadenopathy:      Cervical: No cervical adenopathy.   Skin:     General: Skin is warm.   Neurological:      Mental Status: She is alert and oriented to person, place, and time.   Psychiatric:         Mood and Affect: Mood normal.     Results Encounter on 04/22/2023   Component Date Value Ref Range Status    Glucose 04/25/2023 87  65 - 99 mg/dL Final    BUN 04/25/2023 9  8 - 23 mg/dL Final    Creatinine 04/25/2023 0.67  0.57 - 1.00 mg/dL Final    EGFR Result 04/25/2023 96.5  >60.0 mL/min/1.73 Final    Comment: GFR Normal >60  Chronic Kidney Disease <60  Kidney Failure <15      BUN/Creatinine Ratio 04/25/2023 13.4  7.0 - 25.0 Final    Sodium 04/25/2023 134 (L)  136 - 145 mmol/L Final    Potassium 04/25/2023 4.7  3.5 - 5.2 mmol/L Final    Chloride 04/25/2023 95 (L)  98 - 107 mmol/L Final    Total CO2 04/25/2023 28.9  22.0 - 29.0 mmol/L Final    Calcium 04/25/2023 8.4 (L)  8.6 - 10.5 mg/dL Final     Assessment & Plan   Diagnoses and all orders for this visit:    1. Hyperthyroidism (Primary)  -     TSH  -     T4, Free    2. Osteopenia of multiple sites  -     Comprehensive Metabolic Panel  -     Calcium, Ionized  -     Vitamin D,25-Hydroxy    3. Vitamin D deficiency  -     Vitamin D,25-Hydroxy      Continue methimazole 10 mg 1/2 tablet once a day.  Continue vitamin D3 1000 units/day.  Continue Reclast.  Continue regular exercise.    Copy of my note sent to Trinidad Zhao NP.    RTC 6 mos.

## 2023-07-12 ENCOUNTER — TELEPHONE (OUTPATIENT)
Dept: ENDOCRINOLOGY | Age: 68
End: 2023-07-12

## 2023-07-12 NOTE — TELEPHONE ENCOUNTER
Provider: YSON      Caller: NITZA FROM [facility]    Relationship to Patient: PROVIDER    Reason for Call: NITZA FROM [facility] AT PHONE NUMBER [phone] NEEDS SOMEONE TO PLACE THE THERAPY PLAN FOR THIS PATIENTS RECLAST THAT IS SCHEDULED FOR 7/17/23. THANK YOU    When was the patient last seen: 06/16/23

## 2023-07-13 ENCOUNTER — TELEPHONE (OUTPATIENT)
Dept: ENDOCRINOLOGY | Age: 68
End: 2023-07-13

## 2023-07-13 NOTE — TELEPHONE ENCOUNTER
THERE IS A RECLAST THERAPY ORDER THAT NEEDS TO BE SIGNED FOR THIS PT BEFORE HER APPOINTMENT ON MONDAY PLEASE.      THANK YOU

## 2023-07-14 ENCOUNTER — TELEPHONE (OUTPATIENT)
Dept: ENDOCRINOLOGY | Age: 68
End: 2023-07-14

## 2023-07-14 NOTE — TELEPHONE ENCOUNTER
YESI CALLED IN AND STATED THAT IF THE RECLAST ORDER IS NOT SIGNED BY 3 PM THEY WILL HAVE TO RESCHEDULE THE PT.

## 2023-12-14 RX ORDER — METHIMAZOLE 5 MG/1
TABLET ORAL
Qty: 90 TABLET | Refills: 3 | OUTPATIENT
Start: 2023-12-14

## (undated) DEVICE — ELECTRD BLD EDGE/INSUL1P 2.4X5.1MM STRL

## (undated) DEVICE — PK PROC MINOR TOWER 40

## (undated) DEVICE — LOU GENERAL ROBOT: Brand: MEDLINE INDUSTRIES, INC.

## (undated) DEVICE — SUT VIC 5/0 PS2 18IN J495H

## (undated) DEVICE — GOWN,NON-REINFORCED,SIRUS,SET IN SLV,XXL: Brand: MEDLINE

## (undated) DEVICE — SOL NACL 0.9PCT 1000ML

## (undated) DEVICE — THE TORRENT IRRIGATION SCOPE CONNECTOR IS USED WITH THE TORRENT IRRIGATION TUBING TO PROVIDE IRRIGATION FLUIDS SUCH AS STERILE WATER DURING GASTROINTESTINAL ENDOSCOPIC PROCEDURES WHEN USED IN CONJUNCTION WITH AN IRRIGATION PUMP (OR ELECTROSURGICAL UNIT).: Brand: TORRENT

## (undated) DEVICE — CANN NASL CO2 TRULINK W/O2 A/

## (undated) DEVICE — OBT BLADLES ENDOWRIST DAVINCI/S 8MM

## (undated) DEVICE — SENSR O2 OXIMAX FNGR A/ 18IN NONSTR

## (undated) DEVICE — SUT VIC 3/0 TIES 18IN J110T

## (undated) DEVICE — 3M™ STERI-STRIP™ COMPOUND BENZOIN TINCTURE 40 BAGS/CARTON 4 CARTONS/CASE C1544: Brand: 3M™ STERI-STRIP™

## (undated) DEVICE — ENDOPATH XCEL BLADELESS TROCARS WITH STABILITY SLEEVES: Brand: ENDOPATH XCEL

## (undated) DEVICE — GLV SURG BIOGEL LTX PF 8 1/2

## (undated) DEVICE — ADHS SKIN DERMABOND TOP ADVANCED

## (undated) DEVICE — TBG 02 CRUSH RESIST LF CLR 7FT

## (undated) DEVICE — SOL ANTISTICK CAUTRY ELECTROLUBE LF

## (undated) DEVICE — SUT MNCRYL PLS ANTIB UD 4/0 PS2 18IN

## (undated) DEVICE — FRCP BX RADJAW4 NDL 2.8 240CM LG OG BX40

## (undated) DEVICE — GLV SURG PREMIERPRO ORTHO LTX PF SZ7.5 BRN

## (undated) DEVICE — TIP COVER ACCESSORY

## (undated) DEVICE — BNDG ADHS CURAD FLX/FABRC 2X4IN STRL LF

## (undated) DEVICE — BITEBLOCK OMNI BLOC

## (undated) DEVICE — Device

## (undated) DEVICE — TUBING, SUCTION, 1/4" X 10', STRAIGHT: Brand: MEDLINE

## (undated) DEVICE — BNDG ADHS PLSTC 1X3IN LF

## (undated) DEVICE — SINGLE-USE BIOPSY FORCEPS: Brand: RADIAL JAW 4

## (undated) DEVICE — SUT SILK 2/0 TIES 18IN A185H

## (undated) DEVICE — SUT ETHIB 0/0 MO6 I8IN CX45D

## (undated) DEVICE — STPLR SKIN VISISTAT WD 35CT

## (undated) DEVICE — SUT VIC 3/0 SH 27IN J416H

## (undated) DEVICE — 3M™ STERI-STRIP™ REINFORCED ADHESIVE SKIN CLOSURES, R1547, 1/2 IN X 4 IN (12 MM X 100 MM), 6 STRIPS/ENVELOPE: Brand: 3M™ STERI-STRIP™

## (undated) DEVICE — VIOLET BRAIDED (POLYGLACTIN 910), SYNTHETIC ABSORBABLE SUTURE: Brand: COATED VICRYL

## (undated) DEVICE — UNDYED BRAIDED (POLYGLACTIN 910), SYNTHETIC ABSORBABLE SUTURE: Brand: COATED VICRYL

## (undated) DEVICE — OBT BLADLES DAVINCI/S LNG 8MM

## (undated) DEVICE — Device: Brand: DEFENDO AIR/WATER/SUCTION AND BIOPSY VALVE

## (undated) DEVICE — APPL CHLORAPREP W/TINT 26ML ORNG

## (undated) DEVICE — KT VLV BIOGUARD SXN BIOP AIR/H20 CONN 4PC DISP